# Patient Record
Sex: MALE | Race: WHITE | NOT HISPANIC OR LATINO | Employment: FULL TIME | URBAN - METROPOLITAN AREA
[De-identification: names, ages, dates, MRNs, and addresses within clinical notes are randomized per-mention and may not be internally consistent; named-entity substitution may affect disease eponyms.]

---

## 2017-01-16 ENCOUNTER — OFFICE VISIT (OUTPATIENT)
Dept: INTERNAL MEDICINE | Facility: CLINIC | Age: 42
End: 2017-01-16
Payer: COMMERCIAL

## 2017-01-16 VITALS
TEMPERATURE: 97.6 F | SYSTOLIC BLOOD PRESSURE: 118 MMHG | WEIGHT: 315 LBS | BODY MASS INDEX: 39.17 KG/M2 | HEIGHT: 75 IN | DIASTOLIC BLOOD PRESSURE: 80 MMHG | HEART RATE: 67 BPM | OXYGEN SATURATION: 96 %

## 2017-01-16 DIAGNOSIS — R07.89 CHEST DISCOMFORT: ICD-10-CM

## 2017-01-16 DIAGNOSIS — Z00.01 ENCOUNTER FOR ROUTINE ADULT MEDICAL EXAM WITH ABNORMAL FINDINGS: Primary | ICD-10-CM

## 2017-01-16 PROCEDURE — 93000 ELECTROCARDIOGRAM COMPLETE: CPT | Performed by: INTERNAL MEDICINE

## 2017-01-16 PROCEDURE — 99396 PREV VISIT EST AGE 40-64: CPT | Performed by: INTERNAL MEDICINE

## 2017-01-16 PROCEDURE — 99213 OFFICE O/P EST LOW 20 MIN: CPT | Mod: 25 | Performed by: INTERNAL MEDICINE

## 2017-01-16 NOTE — NURSING NOTE
"Chief Complaint   Patient presents with     Physical     Pt is fasting today.     Chest Pain     x 1 month. Intermittent, around L side chest wall and axilla and sometimes around R chest wall..       Initial /80 mmHg  Pulse 67  Temp(Src) 97.6  F (36.4  C) (Oral)  Ht 6' 2.5\" (1.892 m)  Wt 386 lb 6.4 oz (175.27 kg)  BMI 48.96 kg/m2  SpO2 96% Estimated body mass index is 48.96 kg/(m^2) as calculated from the following:    Height as of this encounter: 6' 2.5\" (1.892 m).    Weight as of this encounter: 386 lb 6.4 oz (175.27 kg).  BP completed using cuff size: large    Kaminibose MA      "

## 2017-01-16 NOTE — MR AVS SNAPSHOT
After Visit Summary   1/16/2017    Ramses Spicer    MRN: [id number]           Patient Information     Date Of Birth          1975        Visit Information        Provider Department      1/16/2017 8:30 AM Rakel Kwan MD [facility]        Today's Diagnoses     Chest discomfort    -  1       Care Instructions    No labs today.    No vaccines today.    EKG today.    If normal, next step is talking to the  re: pectoralis release exercises/manuevers.     If chest symptoms change at all, please let me know.         Follow-ups after your visit        Who to contact     If you have questions or need follow up information about today's clinic visit or your schedule please contact [facility] directly at [phone].  Normal or non-critical lab and imaging results will be communicated to you by MyChart, letter or phone within 4 business days after the clinic has received the results. If you do not hear from us within 7 days, please contact the clinic through [employer] or phone. If you have a critical or abnormal lab result, we will notify you by phone as soon as possible.  Submit refill requests through [employer] or call your pharmacy and they will forward the refill request to us. Please allow 3 business days for your refill to be completed.          Additional Information About Your Visit        MyChart Information     [employer] gives you secure access to your electronic health record. If you see a primary care provider, you can also send messages to your care team and make appointments. If you have questions, please call your primary care clinic.  If you do not have a primary care provider, please call [phone] and they will assist you.        Care EveryWhere ID     This is your Care EveryWhere ID. This could be used by other organizations to access your [facility] medical records  [id number]        Your Vitals Were     Pulse Temperature  "Height BMI (Body Mass Index) Pulse Oximetry       67 97.6  F (36.4  C) (Oral) 6' 2.5\" (1.892 m) 48.96 kg/m2 96%        Blood Pressure from Last 3 Encounters:   01/16/17 118/80   10/26/16 126/84   06/20/16 124/86    Weight from Last 3 Encounters:   01/16/17 386 lb 6.4 oz (175.27 kg)   10/26/16 381 lb 4.8 oz (172.957 kg)   06/20/16 390 lb 6.4 oz (177.084 kg)              We Performed the Following     EKG 12-lead complete w/read - Clinics        Primary Care Provider Office Phone # Fax #    Gael Grant [phone]       [facility] [address] [address]        Thank you!     Thank you for choosing [facility]  for your care. Our goal is always to provide you with excellent care. Hearing back from our patients is one way we can continue to improve our services. Please take a few minutes to complete the written survey that you may receive in the mail after your visit with us. Thank you!             Your Updated Medication List - Protect others around you: Learn how to safely use, store and throw away your medicines at www.disposemymeds.org.          This list is accurate as of: 1/16/17  8:59 AM.  Always use your most recent med list.                   Brand Name Dispense Instructions for use    CLARITIN 10 MG tablet   Generic drug:  loratadine      1 TAB PO QD (Once per day) as needed for ALLERGY SYMPTOMS       PRILOSEC OTC PO      1 tablet daily         "

## 2017-01-16 NOTE — PROGRESS NOTES
"    SUBJECTIVE:                                                      HPI: Ramses Spicer is a pleasant 41 year old male who presents for a physical.    Complains of chest discomfort:  - ongoing for last month  - mostly left side, but a little on right  - pain located in axilla and top of pectoralis muscle  - describes as stiffness - not painful  - usually 2-3/10 in discomfort, occasionally 3-4/10  - no radiation  - non-pleuritic  - occurs at random: unrelated to physical exertion (biking, stairs), lifting (lifts weights regularly), position, or meals  - lasts 20 minutes or so and then goes away on its own - no exacerbating or alleviating factors (no change with rest)    - associated \"warmth\" - ears turn red, but no sweating  - occasional nausea, but no vomiting  - occasional shortness of breath  - occasional light-headedness    - no palpitations    ROS:  Constitutional: denies unintentional weight loss or gain; denies fevers, chills, or sweats     Cardiovascular: see above  Respiratory: see above  Gastrointestinal: denies nausea, vomiting, constipation, diarrhea, or abdominal pain  Genitourinary: denies urinary frequency, urgency, dysuria, or hematuria  Integumentary: denies rash or pruritus  Musculoskeletal: denies back pain, muscle pain, joint pain, or joint swelling  Neurologic: denies focal weakness, numbness, or tingling  Hematologic/Immunologic: denies history of anemia or blood transfusions  Endocrine: denies heat or cold intolerance; denies polyuria, polydipsia  Psychiatric: denies anxiety; see preventative health below    Past Medical History   Diagnosis Date     Acid reflux disease      Morbidly obese (H)      Past Surgical History   Procedure Laterality Date     Gi surgery  infant     ?pylorotomy for pyloric stenosis     Repair tendon achilles Left 3/31/2015     Procedure: REPAIR TENDON ACHILLES;  Surgeon: Camron Schwartz DPM;  Location: [facility]     Excise exostosis foot Left 3/31/2015     Procedure: " "EXCISE EXOSTOSIS FOOT;  Surgeon: Camron Schwartz DPM;  Location: [facility]     Family History   Problem Relation Age of Onset     CEREBROVASCULAR DISEASE Father      early 60s     Pacemaker Father      Myocardial Infarction Paternal Grandmother 97     Myocardial Infarction Maternal Grandfather      in 60s     Type 2 Diabetes Father      resolved with weight loss     Coronary Artery Disease Early Onset No family hx of      Pancreatic Cancer Maternal Grandmother      Colon Cancer No family hx of      Prostate Cancer No family hx of      Social History Main Topics     Smoking status: Never Smoker      Smokeless tobacco: Former User     Quit date: 04/01/2016     Alcohol Use: Yes      Comment: 1 drink/week     Drug Use: No     Sexual Activity:     Partners: Female     Social History Narrative    .    No kids.    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    Stationary bike several times/week; weights     Allergies   Allergen Reactions     Cats      Dogs      Hay Fever & [A.R.M.]      No Known Drug Allergies      Current Outpatient Prescriptions   Medication Sig     PRILOSEC OTC PO 1 tablet daily     CLARITIN 10 MG OR TABS 1 TAB PO QD (Once per day) as needed for ALLERGY SYMPTOMS     Facility-Administered Medications Ordered in Other Visits   Medication     glycopyrrolate (ROBINUL) injection     neostigmine (PROSTIGMINE) injection     Immunization History   Administered Date(s) Administered     Influenza Vaccine IM 3yrs+ 4 Valent IIV4 01/11/2016     Influenza Vaccine, 3 YRS +, IM (QUADRIVALENT W/PRESERVATIVES) 11/25/2014, 10/26/2016     TDAP (ADACEL AGES 11-64) 08/29/2012     OBJECTIVE:                                                      /80 mmHg  Pulse 67  Temp(Src) 97.6  F (36.4  C) (Oral)  Ht 6' 2.5\" (1.892 m)  Wt 386 lb 6.4 oz (175.27 kg)  BMI 48.96 kg/m2  SpO2 96%  Constitutional: well-appearing  Eyes: normal conjunctivae and lids; pupils equal, round, and reactive to light  Ears, " Nose, Mouth, and Throat: normal ears and nose; tympanic membranes visualized and normal; normal lips, teeth, and gums; no oropharyngeal lesions or ulcers  Neck: supple and symmetric; no lymphadenopathy; no thyromegaly or masses  Respiratory: normal respiratory effort; clear to auscultation bilaterally  Cardiovascular: regular rate and rhythm; pedal pulses palpable; no edema  Chest: appears normal - no rash; chest discomfort reproduced with palpation of left pectoralis major lateral attachments (deep left axilla)  Gastrointestinal: soft, non-tender, non-distended, and bowel sounds present; no organomegaly or masses  Musculoskeletal: normal gait and station; no clubbing or cyanosis  Psych: normal judgment and insight; normal mood and affect; recent and remote memory intact; oriented to time, place, and person    PREVENTATIVE HEALTH                                                      Blood pressure: within normal limits   Prostate Cancer Screening: not medically indicated at this time   AAA screening: not medically indicated at this time   Colonoscopy: not medically indicated at this time   Screening HCV: not medically indicated at this time   Screening cholesterol: up to date   Screening diabetes: up to date   STD testing: no risk factors present  Depression screening: PHQ-2 assessment completed and reviewed - no intervention indicated at this time  Alcohol misuse screening: alcohol use reviewed - no intervention indicated at this time  Immunizations: reviewed; up to date     ASSESSMENT/PLAN:                                                       (Z00.01) Encounter for routine adult medical exam with abnormal findings  (primary encounter diagnosis)  Comment: PMH, PSH, FH, SH, medications, allergies, immunizations, and preventative health measures reviewed.   Plan: no preventative health interventions indicated at this time.     (R07.89) Chest discomfort  Comment:    - reassured that pain can be reproduced with  palpation.   - reassured that pain is not associated with exertion and not relieved with rest.   - no active cardiac risk factors.  Plan:    - EKG today.   - if negative, recommend that patient work with his trainers (he's a ) on pectoralis major release exercises.    - patient declines referral to PT.    - if no improvement or if symptoms change or worsen, patient to contact me - low threshold for stress test.     The instructions on the AVS were discussed and explained to the patient. Patient expressed understanding of instructions.    Rakel Kwan MD   [address] [address]  T: [phone], F: [phone]

## 2017-01-16 NOTE — PATIENT INSTRUCTIONS
No labs today.    No vaccines today.    EKG today.    If normal, next step is talking to the  re: pectoralis release exercises/manuevers.     If chest symptoms change at all, please let me know.

## 2017-02-20 ENCOUNTER — OFFICE VISIT (OUTPATIENT)
Dept: PODIATRY | Facility: CLINIC | Age: 42
End: 2017-02-20
Payer: COMMERCIAL

## 2017-02-20 ENCOUNTER — TELEPHONE (OUTPATIENT)
Dept: PODIATRY | Facility: CLINIC | Age: 42
End: 2017-02-20

## 2017-02-20 VITALS
WEIGHT: 315 LBS | SYSTOLIC BLOOD PRESSURE: 128 MMHG | HEIGHT: 75 IN | DIASTOLIC BLOOD PRESSURE: 76 MMHG | BODY MASS INDEX: 39.17 KG/M2

## 2017-02-20 DIAGNOSIS — M21.6X9 PES CAVUS, UNSPECIFIED LATERALITY: ICD-10-CM

## 2017-02-20 DIAGNOSIS — M72.2 PLANTAR FASCIAL FIBROMATOSIS: Primary | ICD-10-CM

## 2017-02-20 PROCEDURE — 99213 OFFICE O/P EST LOW 20 MIN: CPT | Performed by: PODIATRIST

## 2017-02-20 NOTE — MR AVS SNAPSHOT
After Visit Summary   2/20/2017    Ramses Spicer    MRN: [id number]           Patient Information     Date Of Birth          1975        Visit Information        Provider Department      2/20/2017 8:45 AM Camorn Schwartz DPM [facility]        Today's Diagnoses     Plantar fascial fibromatosis    -  1    Pes cavus, unspecified laterality          Care Instructions    Orthotic & Prosthetic Clinic Locations  [facility]  [address]  [place] [address]  Phone: [phone]  Fax: [phone] [facility]  [address]  [place] [address]  Phone: [phone]   Fax: [phone]   [facility]  [address]  [place] [address]  Phone: [phone]  Fax: [phone] [facility]  [address]  [place] [address]  Phone: [phone]   Fax: [phone]   [facility]   [address]  [place] [address] [place] [address]  Phone: [phone]   Fax: [phone] * Please call an location listed to make an appointment for a casting/fitting. Your referral was sent to their central office and they will all have the order on file.     PLANTAR FASCIITIS   What is plantar fasciitis?   Plantar fasciitis is often referred to as heel spurs or heel pain. Plantar fasciitis is a very common problem that affects people of all foot shapes, age, weight and activity level. Pain may be in the arch or on the weight-bearing surface of the heel. The pain maycome on without injury or identifiable cause. Pain is generally present when first getting out of bed in the morning or up from a seated break.   What causes plantar fasciitis?   The plantar fascia is a dense fibrous band of tissue that stretches across the bottom surface of the foot. The fascia helps support the foot muscles  and arch. Plantar fasciitis is thought to be caused by mechanical strain or overload. Frequent walking without shoes or wearing unsupportive shoes is thought to cause structural overload and ultimately inflammation of the plantar fascia. Some people have heel spurs that can be seen on x-ray. The heel spur is actually a minor component of plantar fascitis and is largely ignored.   How long will this last?   Plantar fasciitis can last from one day to a lifetime. Some people get intermittent fascitis that is very short-lived. Others suffer daily for years. Excessive body weight, frequent bare foot walking, long hours on the feet, inadequate shoes, predisposing foot structures and excessive activity such as running are all potential issues that lead to chronic and/or recurring plantar fascitis. Having plantar fasciitis means that you are forever prone to this problem and will require modification of some of the above factors. Most people seek treatment within one to four months. Healing usually requires a similar one to four month time frame. Healing time is relative to the amount of effort spent treating the problem.   What can I do?   The easiest solution is to stop walking around your home without shoes. Plantar fasciitis is largely a shoe problem. Shoes are either not being worn often enough or your current shoes are inadequate for your weight, foot structure or activity level. The majority of shoes on the market today are not sufficient to resist development of plantar fasciitis or to promote healing. Assume that your current shoes are inadequate and will need to be replaced. Even high quality shoes wear out with 6 months to one year of frequent use. Other shoe options can be the ones with springs in the heels. SPIRA spring shoes is one brand type.   Weightloss is another option. Losing ten pounds in the next two months may be enough to resolve the problem. Ice and/or heat applied to the area of pain two to three  times per day for ten minutes each session can be very helpful. This should continue until the problem resolves. Achilles tendon stretching is essential. Stretch multiple times daily to promote healing and to prevent recurrence in the future. Applying bengay or icy/hot to area can also try to calm down pain to the heel area.     What if this does not help?   Medical treatments often include custom arch supports, cortisone injections, physical therapy, splints to be worn in bed, prescription medications and surgery. The home treatments listed above will be necessary regardless of these advanced medical treatments. Surgery is rarely needed but is very helpful in selected cases.     Heel pain in my future?   Plantar fasciitis is highly recurrent. Risk factors often continue, including return to bare foot walking, inadequate shoes, excessive body weight, excessive activities, etc. Your life style and foot structure may predispose you to recurrent plantar fasciitis. A daily prevention regimen can be very helpful. Ongoing use of shoe inserts, careful attention to appropriate shoes, daily Achilles stretching, etc. may prevent recurrence. Prompt attention at the earliest warning signs of heel pain can resolve the problem in as short as a few days.   Below are some exercises for Plantar fasciitis:  Stair exercise  You can step on the stairs with the ball of your foot and hold your position for at least 15 seconds, then slowly step down with the heels of your foot. You can do this daily and as often as you want. Plantar fasciitis exercise equipment and handout materials are useful in relieving pain.  Picking the towel  Plantar fasciitis exercise equipment and handout teach you how to exercise by picking the towel. You can sit comfortably and then pick the towel with your toes. Do this at least 10 to 20 times regularly. You can use any object other than a towel as long as the material can be soft and you can pick it up with  your toes.  Rolling the bottle or ball  You can get a small ball or bottle and then roll it with your foot. Do this daily for at least 15 to 20 times. Plantar fasciitis exercise equipment and handout are very useful in treating the symptoms of the foot condition.  Stretching the calf  You could lie supine, raise one foot, and then point your toes towards the floor. Do this daily for at least 15 to 20 times. The calf is connected to the heel and the balls of the foot, so you should also exercise this also. Plantar fasciitis exercise equipment and handout usually mentions the importance of exercising the calf also.  Flex the toes  Sit comfortably and then flex your toes by pointing it towards the floor or towards your body. This will relax and flex your foot and exercise your plantar fascia, the calf, and the Achilles tendon. The inability of the foot to stretch often causes the bunching up of the plantar fascia area leading to the pain.  Massaging the calf and the plantar fascia also helps a lot in alleviating the pain and preventing its recurrence. If you prefer standard plantar fasciitis exercise equipment and handout, then you can avail of this from legitimate sources. You can use the standard stretching device, the wheel, and the belt. These are all significant devices in treating the pain in the plantar fascia.            Follow-ups after your visit        Who to contact     If you have questions or need follow up information about today's clinic visit or your schedule please contact [facility] directly at [phone].  Normal or non-critical lab and imaging results will be communicated to you by MyChart, letter or phone within 4 business days after the clinic has received the results. If you do not hear from us within 7 days, please contact the clinic through MyChart or phone. If you have a critical or abnormal lab result, we will notify you by phone as soon as possible.  Submit refill  "requests through [employer] or call your pharmacy and they will forward the refill request to us. Please allow 3 business days for your refill to be completed.          Additional Information About Your Visit        TIP [employer] Information     [employer] gives you secure access to your electronic health record. If you see a primary care provider, you can also send messages to your care team and make appointments. If you have questions, please call your primary care clinic.  If you do not have a primary care provider, please call [phone] and they will assist you.        Care EveryWhere ID     This is your Care EveryWhere ID. This could be used by other organizations to access your [facility] medical records  [id number]        Your Vitals Were     Height BMI (Body Mass Index)                6' 2.5\" (1.892 m) 48.9 kg/m2           Blood Pressure from Last 3 Encounters:   02/20/17 128/76   01/16/17 118/80   10/26/16 126/84    Weight from Last 3 Encounters:   02/20/17 (!) 386 lb (175.1 kg)   01/16/17 (!) 386 lb 6.4 oz (175.3 kg)   10/26/16 (!) 381 lb 4.8 oz (173 kg)              Today, you had the following     No orders found for display       Primary Care Provider Office Phone # Fax #    Gael Grant [phone]       [facility] [address] [address]        Thank you!     Thank you for choosing [facility]  for your care. Our goal is always to provide you with excellent care. Hearing back from our patients is one way we can continue to improve our services. Please take a few minutes to complete the written survey that you may receive in the mail after your visit with us. Thank you!             Your Updated Medication List - Protect others around you: Learn how to safely use, store and throw away your medicines at www.disposemymeds.org.          This list is accurate as of: 2/20/17  9:04 AM.  Always use your most recent med list.                   " Brand Name Dispense Instructions for use    CLARITIN 10 MG tablet   Generic drug:  loratadine      1 TAB PO QD (Once per day) as needed for ALLERGY SYMPTOMS       PRILOSEC OTC PO      1 tablet daily

## 2017-02-20 NOTE — PATIENT INSTRUCTIONS
Orthotic & Prosthetic Clinic Locations  [facility]  [address]  [place] [address]  Phone: [phone]  Fax: [phone] [facility]  [address]  [place] [address]  Phone: [phone]   Fax: [phone]   [facility]  [address]  [place] [address]  Phone: [phone]  Fax: [phone] [facility]  [address]  [place] [address]  Phone: [phone]   Fax: [phone]   [facility]   [address]  [place] [address] [place] [address]  Phone: [phone]   Fax: [phone] * Please call an location listed to make an appointment for a casting/fitting. Your referral was sent to their central office and they will all have the order on file.     PLANTAR FASCIITIS   What is plantar fasciitis?   Plantar fasciitis is often referred to as heel spurs or heel pain. Plantar fasciitis is a very common problem that affects people of all foot shapes, age, weight and activity level. Pain may be in the arch or on the weight-bearing surface of the heel. The pain maycome on without injury or identifiable cause. Pain is generally present when first getting out of bed in the morning or up from a seated break.   What causes plantar fasciitis?   The plantar fascia is a dense fibrous band of tissue that stretches across the bottom surface of the foot. The fascia helps support the foot muscles and arch. Plantar fasciitis is thought to be caused by mechanical strain or overload. Frequent walking without shoes or wearing unsupportive shoes is thought to cause structural overload and ultimately inflammation of the plantar fascia. Some people have heel spurs that can be seen on x-ray. The heel spur is actually a minor component of plantar fascitis and is largely ignored.   How long will this last?   Plantar  fasciitis can last from one day to a lifetime. Some people get intermittent fascitis that is very short-lived. Others suffer daily for years. Excessive body weight, frequent bare foot walking, long hours on the feet, inadequate shoes, predisposing foot structures and excessive activity such as running are all potential issues that lead to chronic and/or recurring plantar fascitis. Having plantar fasciitis means that you are forever prone to this problem and will require modification of some of the above factors. Most people seek treatment within one to four months. Healing usually requires a similar one to four month time frame. Healing time is relative to the amount of effort spent treating the problem.   What can I do?   The easiest solution is to stop walking around your home without shoes. Plantar fasciitis is largely a shoe problem. Shoes are either not being worn often enough or your current shoes are inadequate for your weight, foot structure or activity level. The majority of shoes on the market today are not sufficient to resist development of plantar fasciitis or to promote healing. Assume that your current shoes are inadequate and will need to be replaced. Even high quality shoes wear out with 6 months to one year of frequent use. Other shoe options can be the ones with springs in the heels. SPIRA spring shoes is one brand type.   Weightloss is another option. Losing ten pounds in the next two months may be enough to resolve the problem. Ice and/or heat applied to the area of pain two to three times per day for ten minutes each session can be very helpful. This should continue until the problem resolves. Achilles tendon stretching is essential. Stretch multiple times daily to promote healing and to prevent recurrence in the future. Applying bengay or icy/hot to area can also try to calm down pain to the heel area.     What if this does not help?   Medical treatments often include custom arch supports,  cortisone injections, physical therapy, splints to be worn in bed, prescription medications and surgery. The home treatments listed above will be necessary regardless of these advanced medical treatments. Surgery is rarely needed but is very helpful in selected cases.     Heel pain in my future?   Plantar fasciitis is highly recurrent. Risk factors often continue, including return to bare foot walking, inadequate shoes, excessive body weight, excessive activities, etc. Your life style and foot structure may predispose you to recurrent plantar fasciitis. A daily prevention regimen can be very helpful. Ongoing use of shoe inserts, careful attention to appropriate shoes, daily Achilles stretching, etc. may prevent recurrence. Prompt attention at the earliest warning signs of heel pain can resolve the problem in as short as a few days.   Below are some exercises for Plantar fasciitis:  Stair exercise  You can step on the stairs with the ball of your foot and hold your position for at least 15 seconds, then slowly step down with the heels of your foot. You can do this daily and as often as you want. Plantar fasciitis exercise equipment and handout materials are useful in relieving pain.  Picking the towel  Plantar fasciitis exercise equipment and handout teach you how to exercise by picking the towel. You can sit comfortably and then pick the towel with your toes. Do this at least 10 to 20 times regularly. You can use any object other than a towel as long as the material can be soft and you can pick it up with your toes.  Rolling the bottle or ball  You can get a small ball or bottle and then roll it with your foot. Do this daily for at least 15 to 20 times. Plantar fasciitis exercise equipment and handout are very useful in treating the symptoms of the foot condition.  Stretching the calf  You could lie supine, raise one foot, and then point your toes towards the floor. Do this daily for at least 15 to 20 times. The  calf is connected to the heel and the balls of the foot, so you should also exercise this also. Plantar fasciitis exercise equipment and handout usually mentions the importance of exercising the calf also.  Flex the toes  Sit comfortably and then flex your toes by pointing it towards the floor or towards your body. This will relax and flex your foot and exercise your plantar fascia, the calf, and the Achilles tendon. The inability of the foot to stretch often causes the bunching up of the plantar fascia area leading to the pain.  Massaging the calf and the plantar fascia also helps a lot in alleviating the pain and preventing its recurrence. If you prefer standard plantar fasciitis exercise equipment and handout, then you can avail of this from legitimate sources. You can use the standard stretching device, the wheel, and the belt. These are all significant devices in treating the pain in the plantar fascia.

## 2017-02-20 NOTE — PROGRESS NOTES
ASSESSMENT/PLAN:    Encounter Diagnoses   Name Primary?     Plantar fascial fibromatosis Yes     Pes cavus, unspecified laterality      Pt educated about the cause and nature of heel pain.  Treatment plan discussed includes icing, calf and plantar fascial stretching, avoidance of barefoot walking, wearing sturdy, supportive athletic-type shoes, and activity modification.  Educational handouts provided.    Pt is referred to the [facility] for prescription orthoses.      DME order for a Trilok brace to be used with exercise.      Weight management plan: Patient was referred to their PCP to discuss a diet and exercise plan.      Camron Schwartz DPM, FACFAS, MS    [place] & Ankle [facility]      ____________________________________________________________________    HPI:         Chief Complaint: left heel pain  Onset of problem: 1 month  Pain/ discomfort is described as:  shooting  Ratin/10   Frequency:  intermittent    The pain is worse after exercise  Previous treatment: ice, rest    Secondary concerns:  He also is requesting new orthoses.   History of left Achilles surgery, 3/31/15.     *  Past Medical History   Diagnosis Date     Acid reflux disease      Morbidly obese (H)    *  *  Past Surgical History   Procedure Laterality Date     Gi surgery  infant     ?pylorotomy for pyloric stenosis     Repair tendon achilles Left 3/31/2015     Procedure: REPAIR TENDON ACHILLES;  Surgeon: Camron Schwartz DPM;  Location: [facility]     Excise exostosis foot Left 3/31/2015     Procedure: EXCISE EXOSTOSIS FOOT;  Surgeon: Camron Schwartz DPM;  Location: [facility]   *  *  Current Outpatient Prescriptions   Medication Sig Dispense Refill     order for DME Equipment being ordered: Trilok ankle brace 1 Device 1     PRILOSEC OTC PO 1 tablet daily       CLARITIN 10 MG OR TABS 1 TAB PO QD (Once per day) as needed for ALLERGY SYMPTOMS         ROS:    A 10-point review of systems was  "performed.  It is positive for that noted in the HPI and as seen below.  All other systems found to be negative.     Numbness in feet?  no   Calf pain with walking? yes  Recent foot/ankle injury? no  Weight change  over past 12 months? 10# loss  Self perception as overweight? yes  Recent flu-like symptoms? no  Joint pain other than feet ? no    EXAM:    Vitals: /76  Ht 6' 2.5\" (1.892 m)  Wt (!) 386 lb (175.1 kg)  BMI 48.9 kg/m2  BMI: Body mass index is 48.9 kg/(m^2).  Height: 6' 2.5\"    Constitutional/ general:  Pt is in no apparent distress, appears well-nourished.  Cooperative with history and physical exam.     Vascular:  Pedal pulses are palpable bilaterally for both the DP and PT arteries.  CFT < 3 sec.  No edema.  Pedal hair growth noted.     Neuro:  Alert and oriented x 3. Coordinated gait.  Light touch sensation is intact to the L4, L5, S1 distributions. No obvious deficits.  No evidence of neurological-based weakness, spasticity, or contracture in the lower extremities.     Derm: Normal texture and turgor.  No erythema, ecchymosis, or cyanosis.  No open lesions.     Musculoskeletal:    Lower extremity muscle strength is normal.  Patient is ambulatory without an assistive device or brace .  No gross deformities.  Pes cavus.  Pain on palpation to the plantar medial aspect of the left heel.  No pain with   side-to-side compression of the heel.  There is b/l limited ankle dorsiflexion with knee extended.   ROM is increased with flexion of the knee b/l.        Camron Schwartz DPM, FACFAS, MS    [place] [facility] & Ankle Surgery            "

## 2017-02-20 NOTE — TELEPHONE ENCOUNTER
Reason for Call: Request for an order or referral:    Order or referral being requested: orthotics    Date needed: as soon as possible    Has the patient been seen by the PCP for this problem? YES    Additional comments: [facility]---[phone]  Pt saw Dr Schwartz today 2/20/17.  The order for the orthotics hadn't been placed yet when the pt called to make an appt for the orthotics.    Phone number Patient can be reached at:  Home number on file [phone] (home)    Best Time:  anytime    Can we leave a detailed message on this number?  YES    Call taken on 2/20/2017 at 10:29 AM by PK GARCIA

## 2017-02-20 NOTE — NURSING NOTE
"Chief Complaint   Patient presents with     Foot Problems     heel pain on left foot x1 month, would also like new orthtics      Consult     on calluses of both feet        Initial /76  Ht 6' 2.5\" (1.892 m)  Wt (!) 386 lb (175.1 kg)  BMI 48.9 kg/m2 Estimated body mass index is 48.9 kg/(m^2) as calculated from the following:    Height as of this encounter: 6' 2.5\" (1.892 m).    Weight as of this encounter: 386 lb (175.1 kg).  Medication Reconciliation: complete   Bhargavi Brice MA      "

## 2017-07-11 ENCOUNTER — OFFICE VISIT (OUTPATIENT)
Dept: INTERNAL MEDICINE | Facility: CLINIC | Age: 42
End: 2017-07-11
Payer: COMMERCIAL

## 2017-07-11 VITALS
HEART RATE: 69 BPM | DIASTOLIC BLOOD PRESSURE: 68 MMHG | BODY MASS INDEX: 48.01 KG/M2 | SYSTOLIC BLOOD PRESSURE: 128 MMHG | OXYGEN SATURATION: 96 % | WEIGHT: 315 LBS | TEMPERATURE: 98.4 F

## 2017-07-11 DIAGNOSIS — A08.4 VIRAL GASTROENTERITIS: Primary | ICD-10-CM

## 2017-07-11 PROCEDURE — 99213 OFFICE O/P EST LOW 20 MIN: CPT | Performed by: INTERNAL MEDICINE

## 2017-07-11 NOTE — PROGRESS NOTES
SUBJECTIVE:                                                    Ramses Spicer is a 41 year old male who presents to clinic today for the following health issues:      Diarrhea  Onset: 5 days ago    Description:   Consistency of stool: watery  Blood in stool: no   Number of loose stools in past 24 hours: 3    Progression of Symptoms:  same    Accompanying Signs & Symptoms:  Fever: no   Nausea or vomiting; no   Abdominal pain: YES cramping right before the loose stool  Episodes of constipation: no   Weight loss: YES    History:   Ill contacts: no   Recent use of antibiotics: no    Recent travels: no          Recent medication-new or changes(Rx or OTC): no     Precipitating factors:   none    Alleviating factors:   Imodium     Therapies Tried and outcome:  Imodium AD; Outcome: was helpful for about 24 hours     Problem list and histories reviewed & adjusted, as indicated.  Additional history: as documented      Reviewed and updated as needed this visit by clinical staff  Allergies       Reviewed and updated as needed this visit by Provider         ROS:  Constitutional, HEENT, cardiovascular, pulmonary, gi and gu systems are negative, except as otherwise noted.    OBJECTIVE:                                                    /68  Pulse 69  Temp 98.4  F (36.9  C) (Oral)  Wt (!) 379 lb (171.9 kg)  SpO2 96%  BMI 48.01 kg/m2  Body mass index is 48.01 kg/(m^2).  GENERAL APPEARANCE: alert, no distress and over weight  HENT: nose and mouth without ulcers or lesions  NECK: no adenopathy, no asymmetry, masses, or scars and thyroid normal to palpation  RESP: lungs clear to auscultation - no rales, rhonchi or wheezes  CV: regular rates and rhythm, normal S1 S2, no S3 or S4 and no murmur, click or rub  ABDOMEN: soft, nontender, without hepatosplenomegaly or masses and bowel sounds normal    Diagnostic test results:  none      ASSESSMENT/PLAN:                                                    1. Viral  gastroenteritis  Nothing suggestive of anything significant- typical viral gastroenteritis/diarrhea  - see pt info  - prn imodium use  - push fluids    Shamir Yanez MD  [facility]

## 2017-07-11 NOTE — PATIENT INSTRUCTIONS
Diet for Vomiting or Diarrhea (Adult)    Your symptoms may return or get worse after eating certain foods listed below. If this happens, stop eating these foods until your symptoms ease and you feel better.  Once the vomiting stops, follow the steps below.   During the first 12 to 24 hours  During the first 12 to 24 hours, follow this diet:    Drinks. Plain water, sport drinks like electrolyte solutions, soft drinks without caffeine, mineral water (plain or flavored), clear fruit juices, and decaffeinated tea and coffee.    Soups. Clear broth.    Desserts. Plain gelatin, popsicles, and fruit juice bars. As you feel better, you may add 6 to 8 ounces of yogurt per day. If you have diarrhea, don't have foods or drinks that contain sugar, high-fructose corn syrup, or sugar alcohols.  During the next 24 hours  During the next 24 hours you may add the following to the above:    Hot cereal, plain toast, bread, rolls, and crackers    Plain noodles, rice, mashed potatoes, and chicken noodle or rice soup    Unsweetened canned fruit (but not pineapple) and bananas  Don't eat more than 15 grams of fat a day. Do this by staying away from margarine, butter, oils, mayonnaise, sauces, gravies, fried foods, peanut butter, meat, poultry, and fish.  Don't eat much fiber. Stay away from raw or cooked vegetables, fresh fruits (except bananas), and bran cereals.  Limit how much caffeine and chocolate you have. Do not use any spices or seasonings except salt.  During the next 24 hours  Slowly go back to your normal diet, as you feel better and your symptoms ease.  Date Last Reviewed: 8/1/2016 2000-2017 [employer]. [address] [place] [address], [place] [address]. All rights reserved. This information is not intended as a substitute for professional medical care. Always follow your healthcare professional's instructions.

## 2017-07-11 NOTE — MR AVS SNAPSHOT
After Visit Summary   7/11/2017    Ramses Spicer    MRN: [id number]           Patient Information     Date Of Birth          1975        Visit Information        Provider Department      7/11/2017 7:00 AM Shamir Yanez MD [facility]        Today's Diagnoses     Viral gastroenteritis    -  1      Care Instructions      Diet for Vomiting or Diarrhea (Adult)    Your symptoms may return or get worse after eating certain foods listed below. If this happens, stop eating these foods until your symptoms ease and you feel better.  Once the vomiting stops, follow the steps below.   During the first 12 to 24 hours  During the first 12 to 24 hours, follow this diet:    Drinks. Plain water, sport drinks like electrolyte solutions, soft drinks without caffeine, mineral water (plain or flavored), clear fruit juices, and decaffeinated tea and coffee.    Soups. Clear broth.    Desserts. Plain gelatin, popsicles, and fruit juice bars. As you feel better, you may add 6 to 8 ounces of yogurt per day. If you have diarrhea, don't have foods or drinks that contain sugar, high-fructose corn syrup, or sugar alcohols.  During the next 24 hours  During the next 24 hours you may add the following to the above:    Hot cereal, plain toast, bread, rolls, and crackers    Plain noodles, rice, mashed potatoes, and chicken noodle or rice soup    Unsweetened canned fruit (but not pineapple) and bananas  Don't eat more than 15 grams of fat a day. Do this by staying away from margarine, butter, oils, mayonnaise, sauces, gravies, fried foods, peanut butter, meat, poultry, and fish.  Don't eat much fiber. Stay away from raw or cooked vegetables, fresh fruits (except bananas), and bran cereals.  Limit how much caffeine and chocolate you have. Do not use any spices or seasonings except salt.  During the next 24 hours  Slowly go back to your normal diet, as you feel better and your symptoms ease.  Date Last  Reviewed: 8/1/2016 2000-2017 [employer]. [address] [place] [address], [place] [address]. All rights reserved. This information is not intended as a substitute for professional medical care. Always follow your healthcare professional's instructions.                Follow-ups after your visit        Who to contact     If you have questions or need follow up information about today's clinic visit or your schedule please contact [facility] directly at [phone].  Normal or non-critical lab and imaging results will be communicated to you by [employer], letter or phone within 4 business days after the clinic has received the results. If you do not hear from us within 7 days, please contact the clinic through [employer] or phone. If you have a critical or abnormal lab result, we will notify you by phone as soon as possible.  Submit refill requests through [employer] or call your pharmacy and they will forward the refill request to us. Please allow 3 business days for your refill to be completed.          Additional Information About Your Visit        [employer] Information     [employer] gives you secure access to your electronic health record. If you see a primary care provider, you can also send messages to your care team and make appointments. If you have questions, please call your primary care clinic.  If you do not have a primary care provider, please call [phone] and they will assist you.        Care EveryWhere ID     This is your Care EveryWhere ID. This could be used by other organizations to access your [facility] medical records  [id number]        Your Vitals Were     Pulse Temperature Pulse Oximetry BMI (Body Mass Index)          69 98.4  F (36.9  C) (Oral) 96% 48.01 kg/m2         Blood Pressure from Last 3 Encounters:   07/11/17 128/68   02/20/17 128/76   01/16/17 118/80    Weight from Last 3 Encounters:   07/11/17 (!) 379 lb (171.9 kg)   02/20/17 (!) 386 lb (175.1 kg)   01/16/17  (!) 386 lb 6.4 oz (175.3 kg)              Today, you had the following     No orders found for display       Primary Care Provider Office Phone # Fax #    Gael Grant [phone]       [facility] [address] [place] [address]        Equal Access to Services     GLADYS EDOUARD : Hadii aad ku hadasho Soomaali, waaxda luqadaha, qaybta kaalmada adeegyada, waxay idiin hayaan adeeg kharash la'aan ah. So [facility] [phone].    ATENCIÓN: Si habla español, tiene a byers disposición servicios gratuitos de asistencia lingüística. Llame al [phone].    We comply with applicable federal civil rights laws and [place] laws. We do not discriminate on the basis of race, color, national origin, age, disability sex, sexual orientation or gender identity.            Thank you!     Thank you for choosing [facility]  for your care. Our goal is always to provide you with excellent care. Hearing back from our patients is one way we can continue to improve our services. Please take a few minutes to complete the written survey that you may receive in the mail after your visit with us. Thank you!             Your Updated Medication List - Protect others around you: Learn how to safely use, store and throw away your medicines at www.disposemymeds.org.          This list is accurate as of: 7/11/17  7:28 AM.  Always use your most recent med list.                   Brand Name Dispense Instructions for use Diagnosis    CLARITIN 10 MG tablet   Generic drug:  loratadine      1 TAB PO QD (Once per day) as needed for ALLERGY SYMPTOMS        PRILOSEC OTC PO      1 tablet daily

## 2017-10-04 ENCOUNTER — NURSE TRIAGE (OUTPATIENT)
Dept: NURSING | Facility: CLINIC | Age: 42
End: 2017-10-04

## 2017-10-04 ENCOUNTER — HOSPITAL ENCOUNTER (EMERGENCY)
Facility: CLINIC | Age: 42
Discharge: HOME | End: 2017-10-04
Attending: EMERGENCY MEDICINE | Admitting: EMERGENCY MEDICINE
Payer: COMMERCIAL

## 2017-10-04 VITALS
WEIGHT: 315 LBS | BODY MASS INDEX: 39.17 KG/M2 | DIASTOLIC BLOOD PRESSURE: 73 MMHG | SYSTOLIC BLOOD PRESSURE: 150 MMHG | TEMPERATURE: 97.7 F | HEIGHT: 75 IN | OXYGEN SATURATION: 97 %

## 2017-10-04 DIAGNOSIS — T18.9XXA SWALLOWED FOREIGN BODY, INITIAL ENCOUNTER: ICD-10-CM

## 2017-10-04 PROCEDURE — 25000125 ZZHC RX 250

## 2017-10-04 PROCEDURE — 99283 EMERGENCY DEPT VISIT LOW MDM: CPT | Mod: 25

## 2017-10-04 PROCEDURE — 92511 NASOPHARYNGOSCOPY: CPT

## 2017-10-04 RX ORDER — LIDOCAINE HYDROCHLORIDE 40 MG/ML
SOLUTION TOPICAL
Status: COMPLETED
Start: 2017-10-04 | End: 2017-10-04

## 2017-10-04 RX ADMIN — LIDOCAINE HYDROCHLORIDE 2 ML: 40 SOLUTION TOPICAL at 05:20

## 2017-10-04 ASSESSMENT — ENCOUNTER SYMPTOMS
CHOKING: 0
TROUBLE SWALLOWING: 0
COUGH: 0

## 2017-10-04 NOTE — DISCHARGE INSTRUCTIONS
Swallowed Foreign Body (Adult)  Indigestible objects (foreign bodies) are sometimes swallowed by adults. Whether or not the object moves all the way through the digestive tract depends on many factors. This includes the size and shape of the object, whether the object is sharp and pointy, and what the object is made of.  Based on your evaluation, no treatment is needed at this time. The swallowed object is expected to move through your digestive tract and pass out of the body in the stool with no problems. This may take about 24 to 48 hours. If imaging tests were done, you will be told when the results are ready and if they affect your treatment.  Home care    Follow any instructions you receive from your provider about eating and drinking. In certain cases, you may be told to only eat soft foods and drink liquids for the first 24 to 48 hours.    You will need to check your stool each time you have a bowel movement in the next 24 to 48 hours. This is so you can confirm that the object has passed. If the object does not pass during this time, it may mean that the object is lodged (stuck) somewhere along the digestive tract. In such cases, the object may need to be removed with a procedure.  Follow-up care  Follow up with your healthcare provider as advised. You will be told if further treatment is needed. In certain cases, you may need to return to have imaging tests done.  When to seek medical advice  Call your healthcare provider right away if any of these occur:    Belly pain, cramps, or swelling    Shortness of breath or coughing that won t stop    Trouble swallowing or pain with swallowing    Vomiting that won t stop    Blood in the stool (dark red or black color)    Fever of 100.4 F (38 C) or higher, or as directed by your provider  Call 911  Call 911 or return to the emergency department right away if any of these occur:    Trouble breathing, wheezing    Trouble speaking    Unusually fast heart rate    New  or worsening chest pain    Vomiting blood (red or black)    Swelling of the neck    Inability to pass stool  Date Last Reviewed: 6/22/2015 2000-2017 [employer]. [address] [place] [address], [place] [address]. All rights reserved. This information is not intended as a substitute for professional medical care. Always follow your healthcare professional's instructions.

## 2017-10-04 NOTE — ED AVS SNAPSHOT
Emergency Department    [address] [place] [address]    Phone:  [phone]    Fax:  [phone]                                       Ramses Spicer   MRN: [id number]    Department:   Emergency Department   Date of Visit:  10/4/2017           After Visit Summary Signature Page     I have received my discharge instructions, and my questions have been answered. I have discussed any challenges I see with this plan with the nurse or doctor.    ..........................................................................................................................................  Patient/Patient Representative Signature      ..........................................................................................................................................  Patient Representative Print Name and Relationship to Patient    ..................................................               ................................................  Date                                            Time    ..........................................................................................................................................  Reviewed by Signature/Title    ...................................................              ..............................................  Date                                                            Time

## 2017-10-04 NOTE — TELEPHONE ENCOUNTER
Reason for Disposition    Patient sounds very sick or weak to the triager    Additional Information    Negative: Severe difficulty breathing (e.g., struggling for each breath, speaks in single words, stridor)    Negative: Sounds like a life-threatening emergency to the triager    Negative: Injury to tooth or teeth    Negative: Injury to mouth    Negative: Canker sore suspected (i.e., aphthous ulcer) in the mouth    Negative: Cold sore suspected (i.e., fever blister sore) on the outer lip    Negative: Tooth is painful or swelling around a tooth    Negative: Mouth is painful    Negative: Throat is painful    Negative: Tongue swelling    Negative: Lip swelling    Negative: [1] Drooling or spitting out saliva (because can't swallow) AND [2] new onset    Negative: [1] Bleeding from mouth AND [2] won't stop after 10 minutes of direct pressure    Negative: Chemical burn of mouth    Negative: Electrical burn of mouth    Negative: [1] Difficulty breathing AND [2] not severe    Protocols used: MOUTH SYMPTOMS-ADULT-

## 2017-10-04 NOTE — ED AVS SNAPSHOT
Emergency Department    [address] [facility] [address]    Phone:  [phone]    Fax:  [phone]                                       Ramses Spicer   MRN: [id number]    Department:   Emergency Department   Date of Visit:  10/4/2017           Patient Information     Date Of Birth          1975        Your diagnoses for this visit were:     Swallowed foreign body, initial encounter        You were seen by Ant Gallego MD.      Follow-up Information     Follow up with Gael Grant MD. Schedule an appointment as soon as possible for a visit in 2 days.    Specialty:  Internal Medicine    Why:  As needed    Contact information:    [address] [facility] [address]  [phone]          Follow up with  Emergency Department.    Specialty:  EMERGENCY MEDICINE    Why:  If symptoms worsen    Contact information:    [address] [facility] [id number] [phone]        Discharge Instructions         Swallowed Foreign Body (Adult)  Indigestible objects (foreign bodies) are sometimes swallowed by adults. Whether or not the object moves all the way through the digestive tract depends on many factors. This includes the size and shape of the object, whether the object is sharp and pointy, and what the object is made of.  Based on your evaluation, no treatment is needed at this time. The swallowed object is expected to move through your digestive tract and pass out of the body in the stool with no problems. This may take about 24 to 48 hours. If imaging tests were done, you will be told when the results are ready and if they affect your treatment.  Home care    Follow any instructions you receive from your provider about eating and drinking. In certain cases, you may be told to only eat soft foods and drink liquids for the first 24 to 48 hours.    You will need to check your stool each time you have a bowel movement in the next 24 to 48 hours. This is so  you can confirm that the object has passed. If the object does not pass during this time, it may mean that the object is lodged (stuck) somewhere along the digestive tract. In such cases, the object may need to be removed with a procedure.  Follow-up care  Follow up with your healthcare provider as advised. You will be told if further treatment is needed. In certain cases, you may need to return to have imaging tests done.  When to seek medical advice  Call your healthcare provider right away if any of these occur:    Belly pain, cramps, or swelling    Shortness of breath or coughing that won t stop    Trouble swallowing or pain with swallowing    Vomiting that won t stop    Blood in the stool (dark red or black color)    Fever of 100.4 F (38 C) or higher, or as directed by your provider  Call 911  Call 911 or return to the emergency department right away if any of these occur:    Trouble breathing, wheezing    Trouble speaking    Unusually fast heart rate    New or worsening chest pain    Vomiting blood (red or black)    Swelling of the neck    Inability to pass stool  Date Last Reviewed: 6/22/2015 2000-2017 [employer]. [address] [place] [address]. All rights reserved. This information is not intended as a substitute for professional medical care. Always follow your healthcare professional's instructions.          24 Hour Appointment Hotline       To make an appointment at any [facility], call [phone] ([phone]). If you don't have a family doctor or clinic, we will help you find one. [facility] clinics are conveniently located to serve the needs of you and your family.             Review of your medicines      Our records show that you are taking the medicines listed below. If these are incorrect, please call your family doctor or clinic.        Dose / Directions Last dose taken    CLARITIN 10 MG tablet   Generic drug:  loratadine        1 TAB PO QD (Once per day) as  needed for ALLERGY SYMPTOMS   Refills:  0        PRILOSEC OTC PO        1 tablet daily   Refills:  0                Orders Needing Specimen Collection     None      Pending Results     No orders found from 10/2/2017 to 10/5/2017.            Pending Culture Results     No orders found from 10/2/2017 to 10/5/2017.            Pending Results Instructions     If you had any lab results that were not finalized at the time of your Discharge, you can call the ED Lab Result RN at [phone]. You will be contacted by this team for any positive Lab results or changes in treatment. The nurses are available 7 days a week from 10A to 6:30P.  You can leave a message 24 hours per day and they will return your call.        Test Results From Your Hospital Stay               Clinical Quality Measure: Blood Pressure Screening     Your blood pressure was checked while you were in the emergency department today. The last reading we obtained was  BP: 150/73 . Please read the guidelines below about what these numbers mean and what you should do about them.  If your systolic blood pressure (the top number) is less than 120 and your diastolic blood pressure (the bottom number) is less than 80, then your blood pressure is normal. There is nothing more that you need to do about it.  If your systolic blood pressure (the top number) is 120-139 or your diastolic blood pressure (the bottom number) is 80-89, your blood pressure may be higher than it should be. You should have your blood pressure rechecked within a year by a primary care provider.  If your systolic blood pressure (the top number) is 140 or greater or your diastolic blood pressure (the bottom number) is 90 or greater, you may have high blood pressure. High blood pressure is treatable, but if left untreated over time it can put you at risk for heart attack, stroke, or kidney failure. You should have your blood pressure rechecked by a primary care provider within the next 4  "weeks.  If your provider in the emergency department today gave you specific instructions to follow-up with your doctor or provider even sooner than that, you should follow that instruction and not wait for up to 4 weeks for your follow-up visit.        Thank you for choosing [facility]       Thank you for choosing [facility] for your care. Our goal is always to provide you with excellent care. Hearing back from our patients is one way we can continue to improve our services. Please take a few minutes to complete the written survey that you may receive in the mail after you visit with us. Thank you!        [employer] Information     [employer] lets you send messages to your doctor, view your test results, renew your prescriptions, schedule appointments and more. To sign up, go to [email or web address] . Click on \"Log in\" on the left side of the screen, which will take you to the Welcome page. Then click on \"Sign up Now\" on the right side of the page.     You will be asked to enter the access code listed below, as well as some personal information. Please follow the directions to create your username and password.     Your access code is: [email or web address]  Expires: 2018  5:35 AM     Your access code will  in 90 days. If you need help or a new code, please call your [facility] clinic or [phone].        Care EveryWhere ID     This is your Care EveryWhere ID. This could be used by other organizations to access your [facility] medical records  [id number]        Equal Access to Services     GLADYS EDOUARD : Hadii aj Kessler, waaxmilton lugo, qaybta kaalmamilton dunlap, govind dumont. So [facility] [phone].    ATENCIÓN: Si habla español, tiene a byers disposición servicios gratuitos de asistencia lingüística. Llame al [phone].    We comply with applicable federal civil rights laws and [place] laws. We do not discriminate on the basis of race, color, national origin, age, " disability, sex, sexual orientation, or gender identity.            After Visit Summary       This is your record. Keep this with you and show to your community pharmacist(s) and doctor(s) at your next visit.

## 2017-12-19 DIAGNOSIS — Z23 NEED FOR TDAP VACCINATION: Primary | ICD-10-CM

## 2017-12-19 PROCEDURE — 90471 IMMUNIZATION ADMIN: CPT

## 2017-12-19 PROCEDURE — 90715 TDAP VACCINE 7 YRS/> IM: CPT

## 2018-01-09 ENCOUNTER — OFFICE VISIT (OUTPATIENT)
Dept: INTERNAL MEDICINE | Facility: CLINIC | Age: 43
End: 2018-01-09
Payer: COMMERCIAL

## 2018-01-09 VITALS
DIASTOLIC BLOOD PRESSURE: 72 MMHG | HEART RATE: 73 BPM | OXYGEN SATURATION: 99 % | BODY MASS INDEX: 39.17 KG/M2 | HEIGHT: 75 IN | TEMPERATURE: 98 F | WEIGHT: 315 LBS | SYSTOLIC BLOOD PRESSURE: 114 MMHG

## 2018-01-09 DIAGNOSIS — R10.31 BILATERAL GROIN PAIN: Primary | ICD-10-CM

## 2018-01-09 DIAGNOSIS — R10.32 BILATERAL GROIN PAIN: Primary | ICD-10-CM

## 2018-01-09 PROCEDURE — 99213 OFFICE O/P EST LOW 20 MIN: CPT | Performed by: PHYSICIAN ASSISTANT

## 2018-01-09 NOTE — PROGRESS NOTES
"  SUBJECTIVE:   Ramses Spicer is a 42 year old male who presents to clinic today for the following health issues:      Concern - Groin/Above Penis Pain    Onset: x1 wk     Description:   Pt states he has noticed a pain right above and along side of his penis that radiates down to the scrotum, this pain comes and goes x1 wk   Bilateral pain above the penis and testes and radiation below to under the scrotum  No change in pain with BM   No dysuria.   No penial discharge.   No fever chills, or swelling of the scrotum     Intensity: moderate    Progression of Symptoms:  same    Accompanying Signs & Symptoms:  Na     Previous history of similar problem:   No     Precipitating factors:   Worsened by: nothing     Alleviating factors:  Improved by: nothing     Therapies Tried and outcome: aleve-helped for the time being   Pain much better today       -------------------------------------    Problem list and histories reviewed & adjusted, as indicated.  Additional history: as documented    Labs reviewed in EPIC    Reviewed and updated as needed this visit by clinical staffTobacco  Allergies       Reviewed and updated as needed this visit by Provider         ROS:  Constitutional, HEENT, cardiovascular, pulmonary, gi and gu systems are negative, except as otherwise noted.      OBJECTIVE:   /72 (BP Location: Left arm, Patient Position: Chair, Cuff Size: Adult Large)  Pulse 73  Temp 98  F (36.7  C) (Oral)  Ht 6' 2.5\" (1.892 m)  Wt (!) 378 lb 3.2 oz (171.6 kg)  SpO2 99%  BMI 47.91 kg/m2  Body mass index is 47.91 kg/(m^2).  GENERAL: healthy, alert and no distress  RESP: lungs clear to auscultation - no rales, rhonchi or wheezes  CV: regular rate and rhythm, normal S1 S2, no S3 or S4, no murmur, click or rub, no peripheral edema and peripheral pulses strong  ABDOMEN: soft, nontender, without hepatosplenomegaly or masses, bowel sounds normal and obese   (male): testicles normal without atrophy or masses, penis " normal without urethral discharge and tenderness to palpation above the base of the penis, mild- in the , midline. No mass, but patient obese with large fat pad noted.   No inguinal hernia or mass in the scrotum   MS: no gross musculoskeletal defects noted, no edema    Diagnostic Test Results:  none     ASSESSMENT/PLAN:             1. Bilateral groin pain  Pain improving,   Continue to monitor, if this does not resolve see gen surg for consult   - GENERAL SURG ADULT REFERRAL        Nathaly Pitt PA-C  [facility]

## 2018-01-09 NOTE — MR AVS SNAPSHOT
After Visit Summary   1/9/2018    Ramses Spicer    MRN: [id number]           Patient Information     Date Of Birth          1975        Visit Information        Provider Department      1/9/2018 9:00 AM Nathaly Pitt PA-C [facility]        Today's Diagnoses     Bilateral groin pain    -  1      Care Instructions    Monitor,  If not continuing to improve then see gen surg for eval          Follow-ups after your visit        Additional Services     GENERAL SURG ADULT REFERRAL       Your provider has referred you to: [place]: [facility] Ramandeep Peter [phone]   [email or web address]    Please be aware that coverage of these services is subject to the terms and limitations of your health insurance plan.  Call member services at your health plan with any benefit or coverage questions.      Please bring the following with you to your appointment:    (1) Any X-Rays, CTs or MRIs which have been performed.  Contact the facility where they were done to arrange for  prior to your scheduled appointment.   (2) List of current medications   (3) This referral request   (4) Any documents/labs given to you for this referral                  Who to contact     If you have questions or need follow up information about today's clinic visit or your schedule please contact [facility] directly at [phone].  Normal or non-critical lab and imaging results will be communicated to you by MyChart, letter or phone within 4 business days after the clinic has received the results. If you do not hear from us within 7 days, please contact the clinic through MyChart or phone. If you have a critical or abnormal lab result, we will notify you by phone as soon as possible.  Submit refill requests through [employer] or call your pharmacy and they will forward the refill request to us. Please allow 3 business days for your  "refill to be completed.          Additional Information About Your Visit        [employer] Information     [employer] gives you secure access to your electronic health record. If you see a primary care provider, you can also send messages to your care team and make appointments. If you have questions, please call your primary care clinic.  If you do not have a primary care provider, please call [phone] and they will assist you.        Care EveryWhere ID     This is your Care EveryWhere ID. This could be used by other organizations to access your [facility] medical records  [id number]        Your Vitals Were     Pulse Temperature Height Pulse Oximetry BMI (Body Mass Index)       73 98  F (36.7  C) (Oral) 6' 2.5\" (1.892 m) 99% 47.91 kg/m2        Blood Pressure from Last 3 Encounters:   01/09/18 114/72   10/04/17 150/73   07/11/17 128/68    Weight from Last 3 Encounters:   01/09/18 (!) 378 lb 3.2 oz (171.6 kg)   10/04/17 (!) 368 lb 2.7 oz (167 kg)   07/11/17 (!) 379 lb (171.9 kg)              We Performed the Following     GENERAL SURG ADULT REFERRAL        Primary Care Provider Office Phone # Fax #    Gael Grant [phone]       [address] [place], [place] [id number]        Equal Access to Services     GLADYS EDOUARD AH: Hadii aad ku hadasho Soomaali, waaxda luqadaha, qaybta kaalmada aderavenda, govind dumont. So [facility] [phone].    ATENCIÓN: Si habla español, tiene a byers disposición servicios gratuitos de asistencia lingüística. Llame al [phone].    We comply with applicable federal civil rights laws and [place] laws. We do not discriminate on the basis of race, color, national origin, age, disability, sex, sexual orientation, or gender identity.            Thank you!     Thank you for choosing [facility]  for your care. Our goal is always to provide you with excellent care. Hearing back from our patients is one way we can continue to " improve our services. Please take a few minutes to complete the written survey that you may receive in the mail after your visit with us. Thank you!             Your Updated Medication List - Protect others around you: Learn how to safely use, store and throw away your medicines at www.disposemymeds.org.          This list is accurate as of: 1/9/18  9:16 AM.  Always use your most recent med list.                   Brand Name Dispense Instructions for use Diagnosis    CLARITIN 10 MG tablet   Generic drug:  loratadine      1 TAB PO QD (Once per day) as needed for ALLERGY SYMPTOMS        PRILOSEC OTC PO      1 tablet daily

## 2018-03-23 ENCOUNTER — OFFICE VISIT (OUTPATIENT)
Dept: INTERNAL MEDICINE | Facility: CLINIC | Age: 43
End: 2018-03-23
Payer: COMMERCIAL

## 2018-03-23 VITALS
WEIGHT: 315 LBS | OXYGEN SATURATION: 96 % | RESPIRATION RATE: 20 BRPM | SYSTOLIC BLOOD PRESSURE: 124 MMHG | HEART RATE: 74 BPM | DIASTOLIC BLOOD PRESSURE: 62 MMHG | TEMPERATURE: 97.9 F | BODY MASS INDEX: 40.43 KG/M2 | HEIGHT: 74 IN

## 2018-03-23 DIAGNOSIS — K14.6 TONGUE PAIN: Primary | ICD-10-CM

## 2018-03-23 PROCEDURE — 99212 OFFICE O/P EST SF 10 MIN: CPT | Performed by: PHYSICIAN ASSISTANT

## 2018-03-23 ASSESSMENT — ANXIETY QUESTIONNAIRES
2. NOT BEING ABLE TO STOP OR CONTROL WORRYING: NOT AT ALL
IF YOU CHECKED OFF ANY PROBLEMS ON THIS QUESTIONNAIRE, HOW DIFFICULT HAVE THESE PROBLEMS MADE IT FOR YOU TO DO YOUR WORK, TAKE CARE OF THINGS AT HOME, OR GET ALONG WITH OTHER PEOPLE: NOT DIFFICULT AT ALL
GAD7 TOTAL SCORE: 4
1. FEELING NERVOUS, ANXIOUS, OR ON EDGE: NOT AT ALL
3. WORRYING TOO MUCH ABOUT DIFFERENT THINGS: SEVERAL DAYS
5. BEING SO RESTLESS THAT IT IS HARD TO SIT STILL: SEVERAL DAYS
7. FEELING AFRAID AS IF SOMETHING AWFUL MIGHT HAPPEN: SEVERAL DAYS
6. BECOMING EASILY ANNOYED OR IRRITABLE: NOT AT ALL

## 2018-03-23 ASSESSMENT — PATIENT HEALTH QUESTIONNAIRE - PHQ9: 5. POOR APPETITE OR OVEREATING: SEVERAL DAYS

## 2018-03-23 ASSESSMENT — PAIN SCALES - GENERAL: PAINLEVEL: MILD PAIN (2)

## 2018-03-23 NOTE — PROGRESS NOTES
"  SUBJECTIVE:   Ramses Spicer is a 42 year old male who presents to clinic today for the following health issues:    Chief Complaint   Patient presents with     Mouth Problem     Pt states that the back of his tongue is sore, he used to chew and this makes him feel worried.  x 1 week.      - pt presents to the clinic with concerns for pain and soreness on the back of his tongue   - trigger: pt bit his tongue before it started  - pt notes he has been worrying about this constantly   - pt notes due to being concerned patient is constantly looking at his tongue and pulling it out   - pt has concerns because he used to chew tobacco  - denies: lesion, swallowing changes, fever, night sweats, weight loss and coryza     Problem list and histories reviewed & adjusted, as indicated.  Additional history: as documented    Reviewed and updated as needed this visit by clinical staff  Tobacco  Allergies  Meds  Problems       Reviewed and updated as needed this visit by Provider  Meds  Problems             OBJECTIVE:     /62  Pulse 74  Temp 97.9  F (36.6  C) (Oral)  Resp 20  Ht 6' 2\" (1.88 m)  Wt (!) 376 lb 3.2 oz (170.6 kg)  SpO2 96%  BMI 48.3 kg/m2  Body mass index is 48.3 kg/(m^2).  GENERAL: healthy, alert and no distress  HENT: oropharynx clear and oral mucous membranes moist, tongue without lesions   NECK: no adenopathy, no asymmetry, masses, or scars and thyroid normal to palpation    Diagnostic Test Results:  none     ASSESSMENT/PLAN:       1. Tongue pain  -No identifiable cause found on exam today, reviewed with patient he should follow-up with his dentist  -Patient notes he is reassured that there is no lesion, and he thinks the pain is primarily due to his excessive worrying  -Further recommendation was made to try not to worry about the lesion and not to look at the area or irritate the area, and if pain persists for a week, then to follow-up with dentist  -If further concerns or change in symptoms, " follow-up    Patient agreed to the above plan and all questions are answered.     Juliette Bautista PA-C  [facility]

## 2018-03-23 NOTE — PATIENT INSTRUCTIONS
- try to avoid looking at the area or irritating the area   - if pain persist for 1 week from now, recommendation would be see your dentist   - follow up if symptoms worsen or change or if unable to see dentist

## 2018-03-23 NOTE — MR AVS SNAPSHOT
After Visit Summary   3/23/2018    Ramses Spicer    MRN: [id number]           Patient Information     Date Of Birth          1975        Visit Information        Provider Department      3/23/2018 7:20 AM Juliette Bautista PA-C [facility][1]        Care Instructions    - try to avoid looking at the area or irritating the area   - if pain persist for 1 week from now, recommendation would be see your dentist   - follow up if symptoms worsen or change or if unable to see dentist           Follow-ups after your visit        Who to contact     If you have questions or need follow up information about today's clinic visit or your schedule please contact [facility] directly at [phone].  Normal or non-critical lab and imaging results will be communicated to you by [employer], letter or phone within 4 business days after the clinic has received the results. If you do not hear from us within 7 days, please contact the clinic through [employer] or phone. If you have a critical or abnormal lab result, we will notify you by phone as soon as possible.  Submit refill requests through [employer] or call your pharmacy and they will forward the refill request to us. Please allow 3 business days for your refill to be completed.          Additional Information About Your Visit        MyChart Information     [employer] gives you secure access to your electronic health record. If you see a primary care provider, you can also send messages to your care team and make appointments. If you have questions, please call your primary care clinic.  If you do not have a primary care provider, please call [phone] and they will assist you.        Care EveryWhere ID     This is your Care EveryWhere ID. This could be used by other organizations to access your [facility] medical records  [id number]        Your Vitals Were     Pulse Temperature Respirations Height Pulse Oximetry BMI  "(Body Mass Index)    74 97.9  F (36.6  C) (Oral) 20 6' 2\" (1.88 m) 96% 48.3 kg/m2       Blood Pressure from Last 3 Encounters:   03/23/18 124/62   01/09/18 114/72   10/04/17 150/73    Weight from Last 3 Encounters:   03/23/18 (!) 376 lb 3.2 oz (170.6 kg)   01/09/18 (!) 378 lb 3.2 oz (171.6 kg)   10/04/17 (!) 368 lb 2.7 oz (167 kg)              Today, you had the following     No orders found for display       Primary Care Provider Office Phone # Fax #    Gael Grant [phone]       [address] [address]        Equal Access to Services     GLADYS EDOUARD : Hadii aad ku hadasho Soprashanth, waaxda luqadaha, qaybta kaalmada adelaurenyada, govind muniz . So [facility] [phone].    ATENCIÓN: Si habla español, tiene a byers disposición servicios gratuitos de asistencia lingüística. [facility] [phone].    We comply with applicable federal civil rights laws and [place] laws. We do not discriminate on the basis of race, color, national origin, age, disability, sex, sexual orientation, or gender identity.            Thank you!     Thank you for choosing [facility]  for your care. Our goal is always to provide you with excellent care. Hearing back from our patients is one way we can continue to improve our services. Please take a few minutes to complete the written survey that you may receive in the mail after your visit with us. Thank you!             Your Updated Medication List - Protect others around you: Learn how to safely use, store and throw away your medicines at www.disposemymeds.org.          This list is accurate as of 3/23/18  7:42 AM.  Always use your most recent med list.                   Brand Name Dispense Instructions for use Diagnosis    CLARITIN 10 MG tablet   Generic drug:  loratadine      1 TAB PO QD (Once per day) as needed for ALLERGY SYMPTOMS        PRILOSEC OTC PO      1 tablet daily          "

## 2018-03-24 ASSESSMENT — PATIENT HEALTH QUESTIONNAIRE - PHQ9: SUM OF ALL RESPONSES TO PHQ QUESTIONS 1-9: 1

## 2018-03-24 ASSESSMENT — ANXIETY QUESTIONNAIRES: GAD7 TOTAL SCORE: 4

## 2018-06-01 ENCOUNTER — OFFICE VISIT (OUTPATIENT)
Dept: INTERNAL MEDICINE | Facility: CLINIC | Age: 43
End: 2018-06-01
Payer: COMMERCIAL

## 2018-06-01 VITALS
HEIGHT: 74 IN | WEIGHT: 315 LBS | DIASTOLIC BLOOD PRESSURE: 84 MMHG | HEART RATE: 60 BPM | BODY MASS INDEX: 40.43 KG/M2 | TEMPERATURE: 98 F | SYSTOLIC BLOOD PRESSURE: 120 MMHG | RESPIRATION RATE: 16 BRPM

## 2018-06-01 DIAGNOSIS — K13.79 MOUTH SORES: Primary | ICD-10-CM

## 2018-06-01 PROCEDURE — 99213 OFFICE O/P EST LOW 20 MIN: CPT | Performed by: PHYSICIAN ASSISTANT

## 2018-06-01 ASSESSMENT — PAIN SCALES - GENERAL: PAINLEVEL: NO PAIN (0)

## 2018-06-01 NOTE — PROGRESS NOTES
"  SUBJECTIVE:   Ramses Spicer is a 42 year old male who presents to clinic today for the following health issues:      Pt is here today because he had some tongue soreness, but has no resolved. He states that he used to chew and every time he see or feels something on his tongue he worries.   Patient has had some worries in the past about sore areas on the tongue. States he did bite the back right side of the tongue. No pain now and has not had any pain for several days.  Routine dental visits 6 q months and they check his tongue then too.   Seen in clinic 3/2018 for similar concern. - bit tongue then too. No lesions found.    PMH of chewing tobacco use.         -------------------------------------    Problem list and histories reviewed & adjusted, as indicated.  Additional history: as documented    Labs reviewed in EPIC    Reviewed and updated as needed this visit by clinical staff  Tobacco  Allergies  Meds       Reviewed and updated as needed this visit by Provider  Allergies  Meds         ROS:  Constitutional, HEENT, cardiovascular, pulmonary, gi and gu systems are negative, except as otherwise noted.    OBJECTIVE:     /84 (BP Location: Left arm, Patient Position: Chair, Cuff Size: Adult Large)  Pulse 60  Temp 98  F (36.7  C) (Oral)  Resp 16  Ht 6' 2\" (1.88 m)  Wt (!) 381 lb 14.4 oz (173.2 kg)  BMI 49.03 kg/m2  Body mass index is 49.03 kg/(m^2).  GENERAL: healthy, alert and no distress  HENT: normal cephalic/atraumatic, nose and mouth without ulcers or lesions, oropharynx clear and oral mucous membranes moist  NECK: no adenopathy, no asymmetry, masses, or scars and thyroid normal to palpation  RESP: lungs clear to auscultation - no rales, rhonchi or wheezes  CV: regular rates and rhythm and normal S1 S2, no S3 or S4  SKIN: no suspicious lesions or rashes    Diagnostic Test Results:  none     ASSESSMENT/PLAN:             1. Mouth sores  Reassurance given normal oral exam  Continue to follow with " regular dental visits.            Nathaly Pitt PA-C  [facility]

## 2018-06-01 NOTE — MR AVS SNAPSHOT
"              After Visit Summary   6/1/2018    Ramses Spicer    MRN: [id number]           Patient Information     Date Of Birth          1975        Visit Information        Provider Department      6/1/2018 8:00 AM Nathaly Pitt PA-C [facility]        Today's Diagnoses     Mouth sores    -  1       Follow-ups after your visit        Who to contact     If you have questions or need follow up information about today's clinic visit or your schedule please contact [facility] directly at [phone].  Normal or non-critical lab and imaging results will be communicated to you by [employer], letter or phone within 4 business days after the clinic has received the results. If you do not hear from us within 7 days, please contact the clinic through [employer] or phone. If you have a critical or abnormal lab result, we will notify you by phone as soon as possible.  Submit refill requests through [employer] or call your pharmacy and they will forward the refill request to us. Please allow 3 business days for your refill to be completed.          Additional Information About Your Visit        MyChart Information     [employer] gives you secure access to your electronic health record. If you see a primary care provider, you can also send messages to your care team and make appointments. If you have questions, please call your primary care clinic.  If you do not have a primary care provider, please call [phone] and they will assist you.        Care EveryWhere ID     This is your Care EveryWhere ID. This could be used by other organizations to access your [place] medical records  [id number]        Your Vitals Were     Pulse Temperature Respirations Height BMI (Body Mass Index)       60 98  F (36.7  C) (Oral) 16 6' 2\" (1.88 m) 49.03 kg/m2        Blood Pressure from Last 3 Encounters:   06/01/18 120/84   03/23/18 124/62   01/09/18 114/72    Weight from Last 3 " Encounters:   06/01/18 (!) 381 lb 14.4 oz (173.2 kg)   03/23/18 (!) 376 lb 3.2 oz (170.6 kg)   01/09/18 (!) 378 lb 3.2 oz (171.6 kg)              Today, you had the following     No orders found for display       Primary Care Provider Office Phone # Fax #    Gael Grant [phone] [address] [address]        Equal Access to Services     GLADYS EDOUARD : Hadii aad ku hadasho Soomaali, waaxda luqadaha, qaybta kaalmada adeegyada, waxay idiin hayaan adeeg kharacarlos muniz . So [facility] [phone].    ATENCIÓN: Si habla español, tiene a byers disposición servicios gratuitos de asistencia lingüística. Llame al [phone].    We comply with applicable federal civil rights laws and [place] laws. We do not discriminate on the basis of race, color, national origin, age, disability, sex, sexual orientation, or gender identity.            Thank you!     Thank you for choosing [facility]  for your care. Our goal is always to provide you with excellent care. Hearing back from our patients is one way we can continue to improve our services. Please take a few minutes to complete the written survey that you may receive in the mail after your visit with us. Thank you!             Your Updated Medication List - Protect others around you: Learn how to safely use, store and throw away your medicines at www.disposemymeds.org.          This list is accurate as of 6/1/18  8:15 AM.  Always use your most recent med list.                   Brand Name Dispense Instructions for use Diagnosis    CLARITIN 10 MG tablet   Generic drug:  loratadine      1 TAB PO QD (Once per day) as needed for ALLERGY SYMPTOMS        PRILOSEC OTC PO      1 tablet daily        ZANTAC PO      Take 150 mg by mouth

## 2019-09-27 ENCOUNTER — ALLIED HEALTH/NURSE VISIT (OUTPATIENT)
Dept: NURSING | Facility: CLINIC | Age: 44
End: 2019-09-27
Payer: COMMERCIAL

## 2019-09-27 DIAGNOSIS — Z23 NEED FOR PROPHYLACTIC VACCINATION AND INOCULATION AGAINST INFLUENZA: Primary | ICD-10-CM

## 2019-09-27 PROCEDURE — 90686 IIV4 VACC NO PRSV 0.5 ML IM: CPT

## 2019-09-27 PROCEDURE — 90471 IMMUNIZATION ADMIN: CPT

## 2019-11-06 ENCOUNTER — HEALTH MAINTENANCE LETTER (OUTPATIENT)
Age: 44
End: 2019-11-06

## 2019-12-30 ENCOUNTER — OFFICE VISIT (OUTPATIENT)
Dept: INTERNAL MEDICINE | Facility: CLINIC | Age: 44
End: 2019-12-30
Payer: COMMERCIAL

## 2019-12-30 VITALS
HEIGHT: 74 IN | OXYGEN SATURATION: 96 % | SYSTOLIC BLOOD PRESSURE: 124 MMHG | RESPIRATION RATE: 18 BRPM | BODY MASS INDEX: 40.43 KG/M2 | HEART RATE: 66 BPM | DIASTOLIC BLOOD PRESSURE: 84 MMHG | WEIGHT: 315 LBS

## 2019-12-30 DIAGNOSIS — Z13.220 SCREENING FOR CHOLESTEROL LEVEL: ICD-10-CM

## 2019-12-30 DIAGNOSIS — Z00.00 ROUTINE HISTORY AND PHYSICAL EXAMINATION OF ADULT: Primary | ICD-10-CM

## 2019-12-30 DIAGNOSIS — Z13.1 SCREENING FOR DIABETES MELLITUS: ICD-10-CM

## 2019-12-30 DIAGNOSIS — E66.01 MORBIDLY OBESE (H): ICD-10-CM

## 2019-12-30 PROCEDURE — 99396 PREV VISIT EST AGE 40-64: CPT | Performed by: INTERNAL MEDICINE

## 2019-12-30 ASSESSMENT — MIFFLIN-ST. JEOR: SCORE: 2718.36

## 2019-12-30 NOTE — PROGRESS NOTES
SUBJECTIVE                                                      HPI: Ramses Spicer is a pleasant 44 year old male who presents for a physical.    No specific complaints, concerns, or questions.    ROS:  Constitutional: denies unintentional weight loss or gain; denies fevers, chills, or sweats     Cardiovascular: denies chest pain, palpitations, or edema  Respiratory: denies cough, wheezing, shortness of breath, or dyspnea on exertion  Gastrointestinal: denies nausea, vomiting, constipation, diarrhea, or abdominal pain  Genitourinary: denies urinary frequency, urgency, dysuria, or hematuria  Integumentary: denies rash or pruritus  Musculoskeletal: denies back pain, muscle pain, joint pain, or joint swelling  Neurologic: denies focal weakness, numbness, or tingling  Hematologic/Immunologic: denies history of anemia or blood transfusions  Endocrine: denies heat or cold intolerance; denies polyuria, polydipsia  Psychiatric: denies anxiety; see preventative health below    Past Medical History:   Diagnosis Date     Acid reflux disease      Morbidly obese (H)      Past Surgical History:   Procedure Laterality Date     EXCISE EXOSTOSIS FOOT Left 3/31/2015    Procedure: EXCISE EXOSTOSIS FOOT;  Surgeon: Camron Schwartz DPM;  Location: [facility]     GI SURGERY  infant    ?pylorotomy for pyloric stenosis     REPAIR TENDON ACHILLES Left 3/31/2015    Procedure: REPAIR TENDON ACHILLES;  Surgeon: Camron Schwartz DPM;  Location: [facility]     Family History   Problem Relation Age of Onset     Cerebrovascular Disease Father         early 60s     Pacemaker Father      Diabetes Type 2  Father         resolved with weight loss     Myocardial Infarction Paternal Grandmother         later in life     Myocardial Infarction Maternal Grandfather         in 60s     Pancreatic Cancer Maternal Grandmother      Coronary Artery Disease Early Onset No family hx of      Colon Cancer No family hx of      Prostate Cancer No family hx of   "    Social History     Occupational History     Occupation:      Comment: [employer]   Tobacco Use     Smoking status: Never Smoker     Smokeless tobacco: Former User   Substance and Sexual Activity     Alcohol use: Yes     Comment: 1 drink/week     Drug use: No     Sexual activity: Yes     Partners: Female   Social History Narrative    .    Daughter (21 months as of December, 2019; another one due May, 2020).    Stationary bike several times/week.     No Known Allergies     Current Outpatient Medications   Medication Sig     CLARITIN 10 MG OR TABS 1 TAB PO QD (Once per day) as needed for ALLERGY SYMPTOMS     PRILOSEC OTC PO 1 tablet daily     Immunization History   Administered Date(s) Administered     Influenza Vaccine IM > 6 months Valent IIV4 01/11/2016, 09/27/2019     Influenza Vaccine, 3 YRS +, IM (QUADRIVALENT W/PRESERVATIVES) 11/25/2014, 10/26/2016     TDAP Vaccine (Adacel) 08/29/2012, 12/19/2017     OBJECTIVE                                                      /84   Pulse 66   Resp 18   Ht 1.88 m (6' 2\")   Wt (!) 175.4 kg (386 lb 9.6 oz)   SpO2 96%   BMI 49.64 kg/m    Constitutional: well-appearing  Eyes: normal conjunctivae and lids; pupils equal, round, and reactive to light  Ears, Nose, Mouth, and Throat: normal ears and nose; tympanic membranes visualized and normal; normal lips, teeth, and gums; no oropharyngeal lesions or ulcers  Neck: supple and symmetric; no lymphadenopathy; no thyromegaly or masses  Respiratory: normal respiratory effort; clear to auscultation bilaterally  Cardiovascular: regular rate and rhythm; pedal pulses palpable; no edema  Gastrointestinal: soft, non-tender, non-distended, and bowel sounds present; no organomegaly or masses  Musculoskeletal: normal gait and station  Psych: normal judgment and insight; normal mood and affect; recent and remote memory intact; oriented to time, place, and person    PREVENTATIVE HEALTH                             " "                         Blood pressure: within normal limits   Prostate CA Screening: not medically indicated at this time   Colon CA screening: not medically indicated at this time   Lung CA screening: n/a   AAA screening: n/a   Screening HCV: n/a   Screening HIV: completed  Screening cholesterol: DUE  Screening diabetes: DUE  STD testing: no risk factors present  Depression screening: PHQ-2 assessment completed and reviewed - no intervention indicated at this time  Alcohol misuse screening: alcohol use reviewed - no intervention indicated at this time  Immunizations: reviewed; up to date      ASSESSMENT/PLAN                                                       (Z00.00) Routine history and physical examination of adult  (primary encounter diagnosis)  Comment: PMH, PSH, FH, SH, medications, allergies, immunizations, and preventative health measures reviewed.   Plan: see below for plans.    (Z13.220) Screening for cholesterol level  (Z13.1) Screening for diabetes mellitus  Plan: patient will return for fasting labs when able.     (E66.01) Morbidly obese (H)  Comment: discussed the following with patient...    Morbid obesity is when your BMI exceeds 40 or exceeds 35 in the setting of diabetes, high blood pressure, or obstructive sleep apnea.     The term \"morbid\" is not meant to shame patients but rather to highlight the increased risk of illness, life-threatening conditions, and early death that come with this diagnosis.     Individuals with morbid obesity are at a much higher risk for developing numerous illnesses including type 2 diabetes, high blood pressure, heart disease, obstructive sleep apnea, gallbladder disease and gallstones, liver disease, acid reflux/heartburn, menstrual irregularities, female infertility, stress urinary incontinence, depression, and osteoarthritis.     Obesity also increases the risk of certain cancers. Obese men are more likely than non-obese men to die from colon, rectal, or " prostate cancers. Obese women are more likely than non-obese women to die from gallbladder, breast, uterus, cervical, or ovarian cancer.    Finally, individuals with morbid obesity are at higher risk for  premature death (a 20-year shorter life span).     It's not easy to lose weight but your life depends on it. Please adhere to a heart healthy diet and regular exercise (exercise = cardiovascularly vigorous exercise like running, elliptical, or swimming) at least 30 minutes/day, 5 days/week. Weight Watchers is a great program for weight loss. [place] also has it's own weight loss program if you would like a referral.     The instructions on the AVS were discussed and explained to the patient. Patient expressed understanding of instructions.    (Chart documentation was completed, in part, with [employer] voice-recognition software. Even though reviewed, some grammatical, spelling, and word errors may remain.)    Rakel Kwan MD   [address] [address]  T: [phone], F: [phone]

## 2019-12-30 NOTE — PATIENT INSTRUCTIONS
"Iesha Michelle.    ---    Schedule fasting labs when able.    ---    Body mass index is 49.64 kg/m .     Morbid obesity is when your BMI exceeds 40 or exceeds 35 in the setting of diabetes, high blood pressure, or obstructive sleep apnea.     The term \"morbid\" is not meant to shame patients but rather to highlight the increased risk of illness, life-threatening conditions, and early death that come with this diagnosis.     Individuals with morbid obesity are at a much higher risk for developing numerous illnesses including type 2 diabetes, high blood pressure, heart disease, obstructive sleep apnea, gallbladder disease and gallstones, liver disease, acid reflux/heartburn, menstrual irregularities, female infertility, stress urinary incontinence, depression, and osteoarthritis.     Obesity also increases the risk of certain cancers. Obese men are more likely than non-obese men to die from colon, rectal, or prostate cancers. Obese women are more likely than non-obese women to die from gallbladder, breast, uterus, cervical, or ovarian cancer.    Finally, individuals with morbid obesity are at higher risk for  premature death (a 20-year shorter life span).     It's not easy to lose weight but your life depends on it. Please adhere to a heart healthy diet and regular exercise (exercise = cardiovascularly vigorous exercise like running, elliptical, or swimming) at least 30 minutes/day, 5 days/week. Weight Watchers is a great program for weight loss. [place] also has it's own weight loss program if you would like a referral.         "

## 2020-11-24 ENCOUNTER — OFFICE VISIT (OUTPATIENT)
Dept: PODIATRY | Facility: CLINIC | Age: 45
End: 2020-11-24
Payer: COMMERCIAL

## 2020-11-24 VITALS
SYSTOLIC BLOOD PRESSURE: 136 MMHG | DIASTOLIC BLOOD PRESSURE: 74 MMHG | WEIGHT: 315 LBS | BODY MASS INDEX: 39.17 KG/M2 | HEIGHT: 75 IN

## 2020-11-24 DIAGNOSIS — M79.675 TOE PAIN, BILATERAL: Primary | ICD-10-CM

## 2020-11-24 DIAGNOSIS — E66.01 MORBIDLY OBESE (H): ICD-10-CM

## 2020-11-24 DIAGNOSIS — M79.674 TOE PAIN, BILATERAL: Primary | ICD-10-CM

## 2020-11-24 DIAGNOSIS — Q66.70 PES CAVUS: ICD-10-CM

## 2020-11-24 DIAGNOSIS — M72.2 BILATERAL PLANTAR FASCIITIS: ICD-10-CM

## 2020-11-24 PROCEDURE — 99203 OFFICE O/P NEW LOW 30 MIN: CPT | Performed by: PODIATRIST

## 2020-11-24 ASSESSMENT — MIFFLIN-ST. JEOR: SCORE: 2745.12

## 2020-11-24 NOTE — LETTER
11/24/2020         RE: Ramses Spicer  [address]  [place] [address]        Dear Colleague,    Thank you for referring your patient, Ramses Spicer, to the [facility]. Please see a copy of my visit note below.      ASSESSMENT/PLAN:  Encounter Diagnoses   Name Primary?     Toe pain, bilateral Yes     history of Bilateral plantar fasciitis      Pes cavus      Morbidly obese (H)      Ramses Spicer is referred to [facility] for new prescription orthoses.  They help keep him from having heel pain and I consider them preventive, given his body weight.     Using a nail nippers, I trimmed the distal lateral nail corners, bilateral hallux. I did this do demonstrate what he can do at home.  He plans on purchasing a nail nippers.    We discussed the partial nail removal procedure in detail, including the post procedure course for both the avulsion procedure and partial chemical matrixectomy.      Camron Schwartz DPM, FACFAS, MS    [facility]      ____________________________________________________________________    HPI:         Chief Complaint: ingrown toenails  Onset of problem: years  He has occasional pain along the lateral skin folds, bilateral hallux  His pain is more distal.   He keeps them comfortable by trimming away callused skin  He inquires about what is involved with a nail procedure.    Ramses also requests a referral for new orthoses. They were prescribed for plantar fasciitis and help keep him pain free.  *  Patient Active Problem List   Diagnosis     Acid reflux disease     Morbidly obese (H)   *  *  Past Surgical History:   Procedure Laterality Date     EXCISE EXOSTOSIS FOOT Left 3/31/2015    Procedure: EXCISE EXOSTOSIS FOOT;  Surgeon: Camron Schwartz DPM;  Location: [facility]     GI SURGERY  infant    ?pylorotomy for pyloric stenosis     REPAIR TENDON ACHILLES Left 3/31/2015    Procedure: REPAIR  TENDON ACHILLES;  Surgeon: Camron Schwartz DPM;  Location: [facility]   *  *  Current Outpatient Medications   Medication Sig Dispense Refill     CLARITIN 10 MG OR TABS 1 TAB PO QD (Once per day) as needed for ALLERGY SYMPTOMS       PRILOSEC OTC PO 1 tablet daily         ROS:     A 10-point review of systems was performed and is positive for that noted above in the HPI and as seen below.  All other areas are negative.     Numbness in feet?  no   Calf pain with walking? no  Weight change over past 12 months? no  Self perception as overweight? yes  Recent flu-like symptoms? no  Joint pain other than feet ? no    Social History:  Social History     Socioeconomic History     Marital status:      Spouse name: Not on file     Number of children: 0     Years of education: 16     Highest education level: Not on file   Occupational History     Occupation:      Comment: [employer]   Social Needs     Financial resource strain: Not on file     Food insecurity     Worry: Not on file     Inability: Not on file     Transportation needs     Medical: Not on file     Non-medical: Not on file   Tobacco Use     Smoking status: Never Smoker     Smokeless tobacco: Former User   Substance and Sexual Activity     Alcohol use: Yes     Comment: 1 drink/week     Drug use: No     Sexual activity: Yes     Partners: Female   Lifestyle     Physical activity     Days per week: Not on file     Minutes per session: Not on file     Stress: Not on file   Relationships     Social connections     Talks on phone: Not on file     Gets together: Not on file     Attends Voodoo service: Not on file     Active member of club or organization: Not on file     Attends meetings of clubs or organizations: Not on file     Relationship status: Not on file     Intimate partner violence     Fear of current or ex partner: Not on file     Emotionally abused: Not on file     Physically abused: Not on file     Forced sexual activity: Not on file  "  Other Topics Concern     Parent/sibling w/ CABG, MI or angioplasty before 65F 55M? Not Asked   Social History Narrative    .    Daughter (21 months as of December, 2019; another one due May, 2020).    Stationary bike several times/week.       Family history:  Family History   Problem Relation Age of Onset     Cerebrovascular Disease Father         early 60s     Pacemaker Father      Diabetes Type 2  Father         resolved with weight loss     Myocardial Infarction Paternal Grandmother         later in life     Myocardial Infarction Maternal Grandfather         in 60s     Pancreatic Cancer Maternal Grandmother      Coronary Artery Disease Early Onset No family hx of      Colon Cancer No family hx of      Prostate Cancer No family hx of        EXAM:    Vitals: /74   Ht 1.905 m (6' 3\")   Wt (!) 176.4 kg (389 lb)   BMI 48.62 kg/m    BMI: Body mass index is 48.62 kg/m .  Height: 6' 3\"    Constitutional/ general:  Pt is in no apparent distress, appears well-nourished.  Cooperative with history and physical exam.     Vascular:  Pedal pulses are palpable bilaterally for both the DP and PT arteries.  CFT < 3 sec.  No edema.  Pedal hair growth noted.     Neuro:  Alert and oriented x 3. Coordinated gait.  Light touch sensation is intact to the L4, L5, S1 distributions. No obvious deficits.  No evidence of neurological-based weakness, spasticity, or contracture in the lower extremities.     Derm: Normal texture and turgor.  No erythema, ecchymosis, or cyanosis.  No open lesions.   Bilateral hallux nails incurvate. No associated erythema, edema, drainage or pain today.    Musculoskeletal:    Lower extremity muscle strength is normal.  Patient is ambulatory without an assistive device or brace .  No gross deformities.  High medial longitudinal arches.            Again, thank you for allowing me to participate in the care of your patient.        Sincerely,        Camron Schwartz, WOODY    "

## 2020-11-24 NOTE — PROGRESS NOTES
ASSESSMENT/PLAN:  Encounter Diagnoses   Name Primary?     Toe pain, bilateral Yes     history of Bilateral plantar fasciitis      Pes cavus      Morbidly obese (H)      Ramses Spicer is referred to [facility] for new prescription orthoses.  They help keep him from having heel pain and I consider them preventive, given his body weight.     Using a nail nippers, I trimmed the distal lateral nail corners, bilateral hallux. I did this do demonstrate what he can do at home.  He plans on purchasing a nail nippers.    We discussed the partial nail removal procedure in detail, including the post procedure course for both the avulsion procedure and partial chemical matrixectomy.      Camron Schwartz DPM, FACFAS, MS    [facility]      ____________________________________________________________________    HPI:         Chief Complaint: ingrown toenails  Onset of problem: years  He has occasional pain along the lateral skin folds, bilateral hallux  His pain is more distal.   He keeps them comfortable by trimming away callused skin  He inquires about what is involved with a nail procedure.    Ramses also requests a referral for new orthoses. They were prescribed for plantar fasciitis and help keep him pain free.  *  Patient Active Problem List   Diagnosis     Acid reflux disease     Morbidly obese (H)   *  *  Past Surgical History:   Procedure Laterality Date     EXCISE EXOSTOSIS FOOT Left 3/31/2015    Procedure: EXCISE EXOSTOSIS FOOT;  Surgeon: Camron Schwartz DPM;  Location: [facility]     GI SURGERY  infant    ?pylorotomy for pyloric stenosis     REPAIR TENDON ACHILLES Left 3/31/2015    Procedure: REPAIR TENDON ACHILLES;  Surgeon: Camron Schwartz DPM;  Location: [facility]   *  *  Current Outpatient Medications   Medication Sig Dispense Refill     CLARITIN 10 MG OR TABS 1 TAB PO QD (Once per day) as needed for ALLERGY SYMPTOMS       PRILOSEC OTC PO 1 tablet daily          ROS:     A 10-point review of systems was performed and is positive for that noted above in the HPI and as seen below.  All other areas are negative.     Numbness in feet?  no   Calf pain with walking? no  Weight change over past 12 months? no  Self perception as overweight? yes  Recent flu-like symptoms? no  Joint pain other than feet ? no    Social History:  Social History     Socioeconomic History     Marital status:      Spouse name: Not on file     Number of children: 0     Years of education: 16     Highest education level: Not on file   Occupational History     Occupation:      Comment: [employer]   Social Needs     Financial resource strain: Not on file     Food insecurity     Worry: Not on file     Inability: Not on file     Transportation needs     Medical: Not on file     Non-medical: Not on file   Tobacco Use     Smoking status: Never Smoker     Smokeless tobacco: Former User   Substance and Sexual Activity     Alcohol use: Yes     Comment: 1 drink/week     Drug use: No     Sexual activity: Yes     Partners: Female   Lifestyle     Physical activity     Days per week: Not on file     Minutes per session: Not on file     Stress: Not on file   Relationships     Social connections     Talks on phone: Not on file     Gets together: Not on file     Attends Islam service: Not on file     Active member of club or organization: Not on file     Attends meetings of clubs or organizations: Not on file     Relationship status: Not on file     Intimate partner violence     Fear of current or ex partner: Not on file     Emotionally abused: Not on file     Physically abused: Not on file     Forced sexual activity: Not on file   Other Topics Concern     Parent/sibling w/ CABG, MI or angioplasty before 65F 55M? Not Asked   Social History Narrative    .    Daughter (21 months as of December, 2019; another one due May, 2020).    Stationary bike several times/week.       Family  "history:  Family History   Problem Relation Age of Onset     Cerebrovascular Disease Father         early 60s     Pacemaker Father      Diabetes Type 2  Father         resolved with weight loss     Myocardial Infarction Paternal Grandmother         later in life     Myocardial Infarction Maternal Grandfather         in 60s     Pancreatic Cancer Maternal Grandmother      Coronary Artery Disease Early Onset No family hx of      Colon Cancer No family hx of      Prostate Cancer No family hx of        EXAM:    Vitals: /74   Ht 1.905 m (6' 3\")   Wt (!) 176.4 kg (389 lb)   BMI 48.62 kg/m    BMI: Body mass index is 48.62 kg/m .  Height: 6' 3\"    Constitutional/ general:  Pt is in no apparent distress, appears well-nourished.  Cooperative with history and physical exam.     Vascular:  Pedal pulses are palpable bilaterally for both the DP and PT arteries.  CFT < 3 sec.  No edema.  Pedal hair growth noted.     Neuro:  Alert and oriented x 3. Coordinated gait.  Light touch sensation is intact to the L4, L5, S1 distributions. No obvious deficits.  No evidence of neurological-based weakness, spasticity, or contracture in the lower extremities.     Derm: Normal texture and turgor.  No erythema, ecchymosis, or cyanosis.  No open lesions.   Bilateral hallux nails incurvate. No associated erythema, edema, drainage or pain today.    Musculoskeletal:    Lower extremity muscle strength is normal.  Patient is ambulatory without an assistive device or brace .  No gross deformities.  High medial longitudinal arches.        "

## 2020-11-24 NOTE — PATIENT INSTRUCTIONS
"Thank you for choosing [facility] / [facility]!    DR. SOLOMON[facility]S CLINIC LOCATIONS     [facility] SURGERY: [phone]   [address] [place] [address] APPOINTMENTS: [phone]   [place] [address] BILLING QUESTIONS: [phone]  [phone] RADIOLOGY: [phone]       [facility]    [address]    [place], [place] [id number] [phone]      Follow up: as needed    Next steps: look into buying a \"nail nipper\"    Please read through the following handouts and if you have any questions, please feel free to call us or send a [facility] [employer] message!    [facility] LOCATIONS  [facility]  [address]  [place] [address]  Phone: [phone]  Fax: [phone] [facility]  [address]  [place] [address]  Phone: [phone]   Fax: [phone]   [facility]  [address]  [place] [address]  Phone: [phone]  Fax: [phone] [facility]  [address]  [place] [address]  Phone: [phone]   Fax: [phone]   [facility]   [address]  [place] [address]  Phone: [phone]  Fax: [phone] * Please call any location listed to make an appointment for a casting/fitting. Your referral was sent to their central office and they will all have the order on file.     WEARING YOUR CUSTOM FOOT ORTHOTICS   Most insurance plans cover one pair of orthotics per year. You must check with your   insurance plan to see what your payment responsibility will be. Please call your   insurance company by calling the number on the back of your insurance card.   Orthotic's are non-refundable and non-returnable.   Orthotics are made of various designs. Some orthotics are covered with material that extends beyond your toes. If your orthotic is of this design, you will likely need to " trim the toe end to get a proper fit. The insole from your shoe can be used as a template. Simply overlay the shoe insert on top of the custom orthotic. Align the heel end while tracing the length of the insert onto the custom orthotic. Use a large scissor to trim the toe end until you get a proper fit in the shoe.   The orthotic needs to be pushed as far back in the shoe as possible. The heel portion should not ride forward so as not to irritate your heel.   Orthotics are designed to work with socks. Excessive perspiration will shorten the life span of the orthotics. Remove the orthotic from the shoe frequently for proper drying.   The break-in period lasts for weeks. People new to orthotics will likely experience new aches and pains. The orthotic is forcing your foot into a new position. Arch, foot and leg muscle aches and fatigue are common during these weeks. Minor discomfort can be considered normal break in phenomenon. Start wearing your orthotic around your home your first day. Limited activity for one to two hours is recommended. You can increase one or two additional hours each day provided the aches and pains are subsiding. The degree of discomfort, fatigue and problems will dictate the speed of break in. You may require multiple weeks to work up to full time use.   Do not continue wearing your orthotics if they are creating problems such as blisters or sores. Do not hesitate to call the clinic to speak with a nurse regarding orthotic   break in, fit, trimming, etc. You may also need to see the doctor if the orthotics are   simply not working out. Adjustments are sometimes made to improve orthotic   function.     Orthotics will only work in certain styles and types of shoes. Orthotics rarely work in dress shoes. Slip-ons, clogs, sandals and heels are particularly troublesome. Specially designed orthotics may be necessary for these types of shoes. Your custom orthotic was designed for activities that  require appropriate walking or running shoes. Lace up athletic shoes, walking shoes or work boots should work appropriately. You may need a wider or longer shoe. Shoes with a removable  or insert work best. In general, you want to remove an insert from the shoe before placing the orthotic into the shoe. Shoes without a removable liner may not work as well.     When purchasing new shoes, bring your orthotics along to get a proper fit. Shop at stores that are familiar with orthotics.   Frequent washing of the orthotic may shorten the life span of the top cover. The top cover can be replaced but will generally last one to five years depending on use and foot perspiration.

## 2021-02-14 ENCOUNTER — HEALTH MAINTENANCE LETTER (OUTPATIENT)
Age: 46
End: 2021-02-14

## 2021-04-18 ENCOUNTER — VIRTUAL VISIT (OUTPATIENT)
Dept: URGENT CARE | Facility: CLINIC | Age: 46
End: 2021-04-18
Payer: COMMERCIAL

## 2021-04-18 DIAGNOSIS — Z20.822 EXPOSURE TO COVID-19 VIRUS: Primary | ICD-10-CM

## 2021-04-18 PROCEDURE — 99212 OFFICE O/P EST SF 10 MIN: CPT

## 2021-04-18 NOTE — PROGRESS NOTES
Ramses is a 45 year old who is being evaluated via a billable telephone visit.      Assessment & Plan     Exposure to COVID-19 virus  - Asymptomatic COVID-19 Virus (Coronavirus) by PCR; Future    Katia Aponte PA-C  Virtual Urgent Care  [facility] VIRTUAL URGENT CARE    Subjective   Ramses is a 45 year old who presents for the following health issues : covid exposure    HPI - patients wife reports that there was a case of covid at their daughters  and it was suggested they get tested 5 days after exposure. Ramses is having no symptoms.    Review of Systems   Constitutional, HEENT, cardiovascular, pulmonary, gi and gu systems are negative, except as otherwise noted.      Objective           Vitals:  No vitals were obtained today due to virtual visit.    Physical Exam   No exam done      Phone call duration: 2 minutes

## 2021-04-19 DIAGNOSIS — Z20.822 EXPOSURE TO COVID-19 VIRUS: ICD-10-CM

## 2021-04-19 LAB
LABORATORY COMMENT REPORT: NORMAL
SARS-COV-2 RNA RESP QL NAA+PROBE: NEGATIVE
SARS-COV-2 RNA RESP QL NAA+PROBE: NORMAL
SPECIMEN SOURCE: NORMAL
SPECIMEN SOURCE: NORMAL

## 2021-04-19 PROCEDURE — U0005 INFEC AGEN DETEC AMPLI PROBE: HCPCS | Performed by: PHYSICIAN ASSISTANT

## 2021-04-19 PROCEDURE — U0003 INFECTIOUS AGENT DETECTION BY NUCLEIC ACID (DNA OR RNA); SEVERE ACUTE RESPIRATORY SYNDROME CORONAVIRUS 2 (SARS-COV-2) (CORONAVIRUS DISEASE [COVID-19]), AMPLIFIED PROBE TECHNIQUE, MAKING USE OF HIGH THROUGHPUT TECHNOLOGIES AS DESCRIBED BY CMS-2020-01-R: HCPCS | Performed by: PHYSICIAN ASSISTANT

## 2021-08-30 ENCOUNTER — HOSPITAL ENCOUNTER (EMERGENCY)
Facility: CLINIC | Age: 46
Discharge: HOME | End: 2021-08-30
Attending: EMERGENCY MEDICINE | Admitting: EMERGENCY MEDICINE
Payer: COMMERCIAL

## 2021-08-30 VITALS
HEART RATE: 75 BPM | BODY MASS INDEX: 39.17 KG/M2 | TEMPERATURE: 97.6 F | WEIGHT: 315 LBS | DIASTOLIC BLOOD PRESSURE: 70 MMHG | OXYGEN SATURATION: 95 % | RESPIRATION RATE: 16 BRPM | SYSTOLIC BLOOD PRESSURE: 135 MMHG | HEIGHT: 75 IN

## 2021-08-30 DIAGNOSIS — K20.80 PILL ESOPHAGITIS: ICD-10-CM

## 2021-08-30 DIAGNOSIS — T50.905A PILL ESOPHAGITIS: ICD-10-CM

## 2021-08-30 PROCEDURE — 99283 EMERGENCY DEPT VISIT LOW MDM: CPT

## 2021-08-30 ASSESSMENT — MIFFLIN-ST. JEOR: SCORE: 2699.3

## 2021-08-30 NOTE — ED PROVIDER NOTES
"  History   Chief Complaint:  Swallowed Foreign Body       HPI   Ramses Spicer is a 45 year old male who presents with a feeling of a lozenge stuck in his throat at 3a this morning. The patient was able to eat a couple bites of cereal but got nauseous after. He is able to drink water and talk fine. This has happened before.       Review of Systems   Respiratory: Negative for cough and shortness of breath.    Cardiovascular: Negative for chest pain.   Gastrointestinal: Negative for abdominal pain, nausea and vomiting.   All other systems reviewed and are negative.        Allergies:  The patient has no known allergies.     Medications:  Claritin  Prilosec    Past Medical History:    Acid reflux disease  Obesity    Past Surgical History:    Excise exotosis foot  GI surgery  Repair tendon achilles    Family History:    Cerebrovascular disease  Pacemaker  DM    Social History:  The patient presents alone.    Physical Exam     Patient Vitals for the past 24 hrs:   BP Temp Temp src Pulse Resp SpO2 Height Weight   08/30/21 0520 135/70 97.6  F (36.4  C) Temporal 75 16 95 % 1.905 m (6' 3\") (!) 172.4 kg (380 lb)       Physical Exam  General: Appears well-developed and well-nourished.   Head: No signs of trauma.   Mouth/Throat: Oropharynx is clear and moist.   Neck: Normal range of motion. No nuchal rigidity. No cervical adenopathy  CV: Normal rate and regular rhythm.    Resp: Effort normal and breath sounds normal. No respiratory distress.   MSK: Normal range of motion.   Neuro: The patient is alert and oriented. Speech normal.  Skin: Skin is warm and dry. No rash noted.   Psych: normal mood and affect. behavior is normal.       Emergency Department Course     Emergency Department Course:    Reviewed:  I reviewed nursing notes, vitals, past medical history and care everywhere    Assessments:  0527 I obtained history and examined the patient as noted above.     0601 I rechecked the patient and explained findings. "     Disposition:  The patient was discharged to home.       Impression & Plan     Medical Decision Making:  Ramses Spicer is a 45-year-old gentleman who presents due to a feeling of having a lozenge stuck in his throat. He states that he had woken up and had a feeling of dry mouth and had to put a lozenge in his mouth. He then fallen asleep and believes that he had swallowed the lozenge. This morning he had a foreign body sensation in his throat and when he tried to eat he was able to do so but had some discomfort with it. He has not had any difficulty breathing or drinking fluids. In my evaluation patient was speaking in full sentences and the upper airway was clear. Patient had the same presentation in the past and at that time a fiberoptic scope had been used to evaluate the upper esophagus. Patient did request this again. Unfortunately the nasal fiberoptic scope was not available. While the patient was waiting he had resolution of his symptoms. Clinically I do not suspect continued foreign body but rather I believes he has some irritation to the throat causing his symptoms given his ability to eat and drink. Patient was given omeprazole and recommended follow-up with his doctor and return for any further concerns.    Diagnosis:    ICD-10-CM    1. Pill esophagitis  K20.80     T50.905A        Discharge Medications:  New Prescriptions    OMEPRAZOLE (PRILOSEC) 20 MG DR CAPSULE    Take 1 capsule (20 mg) by mouth daily for 15 days       Scribe Disclosure:  Go CHANEL, am serving as a scribe at 5:28 AM on 8/30/2021 to document services personally performed by Derrick Vinson MD based on my observations and the provider's statements to me.          Derrick Vinson MD  08/31/21 0716

## 2021-08-30 NOTE — ED TRIAGE NOTES
Pt says he swallowed a lozenge earlier and now it feels like it is stuck in his throat. He is able to keep down food and fluids.

## 2021-08-31 ASSESSMENT — ENCOUNTER SYMPTOMS
SHORTNESS OF BREATH: 0
ABDOMINAL PAIN: 0
COUGH: 0
NAUSEA: 0
VOMITING: 0

## 2021-09-19 ENCOUNTER — HEALTH MAINTENANCE LETTER (OUTPATIENT)
Age: 46
End: 2021-09-19

## 2022-01-28 ENCOUNTER — E-VISIT (OUTPATIENT)
Dept: INTERNAL MEDICINE | Facility: CLINIC | Age: 47
End: 2022-01-28
Payer: COMMERCIAL

## 2022-01-28 DIAGNOSIS — J06.9 UPPER RESPIRATORY TRACT INFECTION, UNSPECIFIED TYPE: Primary | ICD-10-CM

## 2022-01-28 PROCEDURE — 99421 OL DIG E/M SVC 5-10 MIN: CPT | Performed by: INTERNAL MEDICINE

## 2022-03-06 ENCOUNTER — HEALTH MAINTENANCE LETTER (OUTPATIENT)
Age: 47
End: 2022-03-06

## 2022-04-15 ENCOUNTER — OFFICE VISIT (OUTPATIENT)
Dept: INTERNAL MEDICINE | Facility: CLINIC | Age: 47
End: 2022-04-15
Payer: COMMERCIAL

## 2022-04-15 VITALS
BODY MASS INDEX: 39.17 KG/M2 | SYSTOLIC BLOOD PRESSURE: 112 MMHG | TEMPERATURE: 97.3 F | OXYGEN SATURATION: 97 % | HEART RATE: 75 BPM | HEIGHT: 75 IN | DIASTOLIC BLOOD PRESSURE: 80 MMHG | WEIGHT: 315 LBS

## 2022-04-15 DIAGNOSIS — R09.81 NASAL CONGESTION: ICD-10-CM

## 2022-04-15 DIAGNOSIS — Z00.00 ROUTINE GENERAL MEDICAL EXAMINATION AT A HEALTH CARE FACILITY: ICD-10-CM

## 2022-04-15 DIAGNOSIS — K21.9 GASTROESOPHAGEAL REFLUX DISEASE WITHOUT ESOPHAGITIS: Primary | ICD-10-CM

## 2022-04-15 DIAGNOSIS — Z13.220 SCREENING FOR HYPERLIPIDEMIA: ICD-10-CM

## 2022-04-15 DIAGNOSIS — E66.01 MORBIDLY OBESE (H): ICD-10-CM

## 2022-04-15 DIAGNOSIS — Z12.11 SCREEN FOR COLON CANCER: ICD-10-CM

## 2022-04-15 DIAGNOSIS — Z11.59 NEED FOR HEPATITIS C SCREENING TEST: ICD-10-CM

## 2022-04-15 LAB
CHOLEST SERPL-MCNC: 118 MG/DL
FASTING STATUS PATIENT QL REPORTED: YES
FASTING STATUS PATIENT QL REPORTED: YES
GLUCOSE BLD-MCNC: 96 MG/DL (ref 70–99)
HCV AB SERPL QL IA: NONREACTIVE
HDLC SERPL-MCNC: 33 MG/DL
LDLC SERPL CALC-MCNC: 73 MG/DL
NONHDLC SERPL-MCNC: 85 MG/DL
TRIGL SERPL-MCNC: 61 MG/DL

## 2022-04-15 PROCEDURE — 80061 LIPID PANEL: CPT | Performed by: NURSE PRACTITIONER

## 2022-04-15 PROCEDURE — 86803 HEPATITIS C AB TEST: CPT | Performed by: NURSE PRACTITIONER

## 2022-04-15 PROCEDURE — 99386 PREV VISIT NEW AGE 40-64: CPT | Performed by: NURSE PRACTITIONER

## 2022-04-15 PROCEDURE — 36415 COLL VENOUS BLD VENIPUNCTURE: CPT | Performed by: NURSE PRACTITIONER

## 2022-04-15 PROCEDURE — 82947 ASSAY GLUCOSE BLOOD QUANT: CPT | Performed by: NURSE PRACTITIONER

## 2022-04-15 RX ORDER — FLUTICASONE PROPIONATE 50 MCG
1 SPRAY, SUSPENSION (ML) NASAL DAILY
COMMUNITY
End: 2022-09-30

## 2022-04-15 ASSESSMENT — ENCOUNTER SYMPTOMS
PARESTHESIAS: 0
ABDOMINAL PAIN: 0
SHORTNESS OF BREATH: 0
JOINT SWELLING: 0
FEVER: 0
EYE PAIN: 0
CONSTIPATION: 0
DYSURIA: 0
NAUSEA: 0
DIZZINESS: 0
ARTHRALGIAS: 0
HEADACHES: 0
MYALGIAS: 0
NERVOUS/ANXIOUS: 0
WEAKNESS: 0
CHILLS: 0
HEMATOCHEZIA: 0
HEMATURIA: 0
PALPITATIONS: 0
FREQUENCY: 0
SORE THROAT: 1
COUGH: 0
DIARRHEA: 0
HEARTBURN: 0

## 2022-04-15 NOTE — PATIENT INSTRUCTIONS
Preventive Health Recommendations  Male Ages 40 to 49    Yearly exam:             See your health care provider every year in order to  o   Review health changes.   o   Discuss preventive care.    o   Review your medicines if your doctor has prescribed any.  You should be tested each year for STDs (sexually transmitted diseases) if you re at risk.   Have a cholesterol test every 5 years.   Have a colonoscopy (test for colon cancer) if someone in your family has had colon cancer or polyps before age 50.   After age 45, have a diabetes test (fasting glucose). If you are at risk for diabetes, you should have this test every 3 years.    Talk with your health care provider about whether or not a prostate cancer screening test (PSA) is right for you.    Shots: Get a flu shot each year. Get a tetanus shot every 10 years.     Nutrition:  Eat at least 5 servings of fruits and vegetables daily.   Eat whole-grain bread, whole-wheat pasta and brown rice instead of white grains and rice.   Get adequate Calcium and Vitamin D.     Lifestyle  Exercise for at least 150 minutes a week (30 minutes a day, 5 days a week). This will help you control your weight and prevent disease.   Limit alcohol to one drink per day.   No smoking.   Wear sunscreen to prevent skin cancer.   See your dentist every six months for an exam and cleaning.      -Try taking Zyrtec- can take twice daily  -Continue flonase nasal spray  -If things don't improve, consider referral to Allergy Clinic

## 2022-04-15 NOTE — PROGRESS NOTES
SUBJECTIVE:   CC: Ramses Spicer is an 46 year old male who presents for preventative health visit.       Patient has been advised of split billing requirements and indicates understanding: Yes     Last seen 12--Dr. Kwan    Healthy Habits:     Getting at least 3 servings of Calcium per day:  Yes    Bi-annual eye exam:  NO    Dental care twice a year:  Yes    Sleep apnea or symptoms of sleep apnea:  Excessive snoring and Sleep apnea    Diet:  Regular (no restrictions)    Frequency of exercise:  2-3 days/week    Duration of exercise:  30-45 minutes    Taking medications regularly:  Yes    Barriers to taking medications:  None    Medication side effects:  Not applicable    PHQ-2 Total Score: 0    Additional concerns today:  Yes    Health Maintenance Screening:    -Immunizations:   -Influenza: Up to date   -Pneumococcal: NA   -Td/Tdap: Up to date   -Shingles: NA   -HPV: NA   -MMR: NA   -COVID Up to date, fully vaccinated    -Colon Cancer Screening:  Due    -PSA:  No symptoms; defer screening until age 50- discussed with patient    -Lung Cancer Screening:  NA    -STD Screen:  Low risk, declines.  Accepts 1x Hep C screen    -Cholesterol Screening:  Due    -Diabetes Screening:  Due    -Depression Screening: Due    Today's PHQ-2 Score:   PHQ-2 ( 1999 Pfizer) 12/30/2019   Q1: Little interest or pleasure in doing things 0   Q2: Feeling down, depressed or hopeless 0   PHQ-2 Score 0   PHQ-2 Total Score (12-17 Years)- Positive if 3 or more points; Administer PHQ-A if positive 0   Q1: Little interest or pleasure in doing things -   Q2: Feeling down, depressed or hopeless -   PHQ-2 Score -     Other Concern:    Nasal congestion:  Has been having for several months.  Nasal congestion, drainage.  Taking Claritin daily and as well as Flonase daily.  States medications help short-term but the congestion and drainage come back.  He was having clear drainage.  He has a mild sore throat.  Endorses that his kids are in  and  they are also sick with cold symptoms frequently.  Denies fevers or chills.  No sinus pain.  Does have allergies to pets, has a dog.    Snores:  Has been seen by [facility].  States he did not qualify for a CPAP rather he is using an oral device which is helpful.    Abuse: Current or Past(Physical, Sexual or Emotional)- No  Do you feel safe in your environment? Yes    Have you ever done Advance Care Planning? (For example, a Health Directive, POLST, or a discussion with a medical provider or your loved ones about your wishes): No, advance care planning information given to patient to review.  Patient declined advance care planning discussion at this time.    Social History     Tobacco Use     Smoking status: Never Smoker     Smokeless tobacco: Former User     Quit date: 4/1/2016   Substance Use Topics     Alcohol use: Yes     Comment: 1 drink/week     If you drink alcohol do you typically have >3 drinks per day or >7 drinks per week? No    Alcohol Use 12/29/2019   Prescreen: >3 drinks/day or >7 drinks/week? No   No flowsheet data found.    Last PSA: No results found for: PSA    Reviewed orders with patient. Reviewed health maintenance and updated orders accordingly - Yes  Lab work is in process  Labs reviewed in EPIC    Reviewed and updated as needed this visit by clinical staff                    Reviewed and updated as needed this visit by Provider                   Past Medical History:   Diagnosis Date     Acid reflux disease      Morbidly obese (H)       Past Surgical History:   Procedure Laterality Date     EXCISE EXOSTOSIS FOOT Left 3/31/2015    Procedure: EXCISE EXOSTOSIS FOOT;  Surgeon: Camron Schwartz DPM;  Location: [facility]     GI SURGERY  infant    ?pylorotomy for pyloric stenosis     REPAIR TENDON ACHILLES Left 3/31/2015    Procedure: REPAIR TENDON ACHILLES;  Surgeon: Camron Schwartz DPM;  Location: [facility]       Review of Systems   Constitutional: Negative for chills and fever.   HENT: Positive  "for congestion and sore throat. Negative for ear pain and hearing loss.    Eyes: Negative for pain and visual disturbance.   Respiratory: Negative for cough and shortness of breath.    Cardiovascular: Negative for chest pain, palpitations and peripheral edema.   Gastrointestinal: Negative for abdominal pain, constipation, diarrhea, heartburn, hematochezia and nausea.   Genitourinary: Negative for dysuria, frequency, genital sores, hematuria, impotence, penile discharge and urgency.   Musculoskeletal: Negative for arthralgias, joint swelling and myalgias.   Skin: Negative for rash.   Neurological: Negative for dizziness, weakness, headaches and paresthesias.   Psychiatric/Behavioral: Negative for mood changes. The patient is not nervous/anxious.      CONSTITUTIONAL: NEGATIVE for fever, chills, change in weight  INTEGUMENTARY/SKIN: NEGATIVE for worrisome rashes, moles or lesions  EYES: NEGATIVE for vision changes or irritation  ENT: POSITIVE for nasal congestion and drainage, mild sore throat  RESP: NEGATIVE for significant cough or SOB  CV: NEGATIVE for chest pain, palpitations or peripheral edema  GI: NEGATIVE for nausea, abdominal pain, heartburn, or change in bowel habits   male: negative for dysuria, hematuria, decreased urinary stream, erectile dysfunction, urethral discharge  MUSCULOSKELETAL: NEGATIVE for significant arthralgias or myalgia  NEURO: NEGATIVE for weakness, dizziness or paresthesias  PSYCHIATRIC: NEGATIVE for changes in mood or affect    OBJECTIVE:   /80   Pulse 75   Temp 97.3  F (36.3  C) (Tympanic)   Ht 1.905 m (6' 3\")   Wt (!) 171.9 kg (379 lb)   SpO2 97%   BMI 47.37 kg/m      Physical Exam  GENERAL: healthy, alert and no distress  EYES: Eyes grossly normal to inspection, PERRL and conjunctivae and sclerae normal  HENT: ear canals and TM's normal, nose and mouth without ulcers or lesions  NECK: no adenopathy, no asymmetry, masses, or scars and thyroid normal to palpation  RESP: " lungs clear to auscultation - no rales, rhonchi or wheezes  CV: regular rate and rhythm, normal S1 S2, no S3 or S4, no murmur, click or rub, no peripheral edema and peripheral pulses strong  ABDOMEN: soft, nontender, no hepatosplenomegaly, no masses and bowel sounds normal  MS: no gross musculoskeletal defects noted, no edema  SKIN: no suspicious lesions or rashes  NEURO: Normal strength and tone, mentation intact and speech normal  PSYCH: mentation appears normal, affect normal/bright    Diagnostic Test Results:  Labs reviewed in Epic    ASSESSMENT/PLAN:   Ramses was seen today for physical.    Diagnoses and all orders for this visit:    Gastroesophageal reflux disease without esophagitis    Routine general medical examination at a health care facility  -     REVIEW OF HEALTH MAINTENANCE PROTOCOL ORDERS  -     Glucose; Future  -     Glucose  -     Labs ordered  -     Colonoscopy ordered  -     Immunizations up to date    Screen for colon cancer  -     Adult Gastro Ref - Procedure Only; Future    Need for hepatitis C screening test  -     Hepatitis C Screen Reflex to HCV RNA Quant and Genotype; Future  -     Hepatitis C Screen Reflex to HCV RNA Quant and Genotype    Screening for hyperlipidemia  -     Lipid panel reflex to direct LDL Fasting; Future  -     Lipid panel reflex to direct LDL Fasting    Morbidly obese (H)  - BMI 47.37, GERD    Nasal congestion  - Suspect viral and allergy component.  Exam unrevealing- mild posterior oropharyngeal erythema, but no exudates or swelling  - Advised to try Zyrtec, could take BID, continue flonase  - If symptoms persist, consider referral to Allergy Clinic.  Patient agrees with plan      Patient has been advised of split billing requirements and indicates understanding: Yes    COUNSELING:   Reviewed preventive health counseling, as reflected in patient instructions    Estimated body mass index is 47.5 kg/m  as calculated from the following:    Height as of 8/30/21: 1.905 m (6'  "3\").    Weight as of 8/30/21: 172.4 kg (380 lb).     Weight management plan: Discussed healthy diet and exercise guidelines    He reports that he has never smoked. He quit smokeless tobacco use about 6 years ago.      Counseling Resources:  ATP IV Guidelines  Pooled Cohorts Equation Calculator  FRAX Risk Assessment  ICSI Preventive Guidelines  Dietary Guidelines for Americans, 2010  USDA's MyPlate  ASA Prophylaxis  Lung CA Screening    ANDREW Madison CNP  [facility]  "

## 2022-09-30 ENCOUNTER — E-VISIT (OUTPATIENT)
Dept: URGENT CARE | Facility: CLINIC | Age: 47
End: 2022-09-30
Payer: COMMERCIAL

## 2022-09-30 DIAGNOSIS — Z20.822 SUSPECTED COVID-19 VIRUS INFECTION: Primary | ICD-10-CM

## 2022-09-30 PROCEDURE — 99207 PR NO BILLABLE SERVICE THIS VISIT: CPT | Performed by: PHYSICIAN ASSISTANT

## 2022-09-30 NOTE — PATIENT INSTRUCTIONS
Dear Ramses,      Based on your responses, you may have coronavirus (COVID-19).     Will I be tested for COVID-19?  We would like to test you for COVID. I have placed orders for this test.     To schedule: go to your Backbl[employer] home page and scroll down to the section that says  You have an appointment that needs to be scheduled  and click the large green button that says  Schedule Now  and follow the steps to find the next available openings.    If you are unable to complete these Backblaze scheduling steps, please call [phone] to schedule your testing.     These guidelines are for isolating before returning to work, school or .     For employers, schools and day cares: This is an official notice for this person s medical guidelines for returning in-person.     For health care sites: The CDC gives different isolation and quarantine guidelines for healthcare sites, please check with these sites before arriving.     How do I self-isolate?  You isolate when you have symptoms of COVID or a test shows you have COVID, even if you don t have symptoms.     If you DO have symptoms:  o Stay home and away from others  - For at least 5 days after your symptoms started, AND   - You are fever free for 24 hours (with no medicine that reduces fever), AND  - Your other symptoms are better.  o Wear a mask for 10 full days any time you are around others.    If you DON T have symptoms:  o Stay at home and away from others for at least 5 days after your positive test.  o Wear a mask for 10 full days any time you are around others.    How can I take care of myself?  Over the counter medications may help with your symptoms such as runny or stuffy nose, cough, chills, or fever.  Talk to your care team about your options.     Some people are at high risk of severe illness (for example, you have a weak immune system, you re 65 years or older, or you have certain medical problems). If your risk is high and your symptoms started in the  last 5 to 7 days, we strongly recommend for you to get COVID treatment as soon as possible. Paxlovid, Molnupiravir and the monoclonal antibody treatments are proven safe and effective, make you feel better faster, and prevent hospitalization and death.       To schedule an appointment to discuss COVID treatment, request an appointment on MyChart (select  COVID-19 Treatment ) or call [phone] ([phone]), press [phone].      Get lots of rest. Drink extra fluids (unless a doctor has told you not to)    Take Tylenol (acetaminophen) or ibuprofen for fever or pain. If you have liver or kidney problems, ask your family doctor if it's okay to take Tylenol or ibuprofen    Take over the counter medications for your symptoms, as directed by your doctor. You may also talk to your pharmacist.      If you have other health problems (like cancer, heart failure, an organ transplant or severe kidney disease): Call your specialty clinic if you don't feel better in the next 2 days.    Know when to call 911. Emergency warning signs include:  o Trouble breathing or shortness of breath  o Pain or pressure in the chest that doesn't go away  o Feeling confused like you haven't felt before, or not being able to wake up  o Bluish-colored lips or face    Where can I get more information?    [facility] - About COVID-19: [email or web address]     CDC - What to Do If You're Sick: https://www.cdc.gov/coronavirus/2019-ncov/if-you-are-sick/index.html     CDC - Quarantine & Isolation: https://www.cdc.gov/coronavirus/2019-ncov/your-health/quarantine-isolation.html     [facility] clinical trials (COVID-19 research studies): [email or web address]/umn-clinical-trials    Below are the COVID-19 hotlines at the [facility] ([facility]). Interpreters are available.  o For health questions: Call 892-630-4909 or 1-310.125.9095 (7 a.m. to 7 p.m.)  o For questions about schools and childcare: Call 577-884-9771 or  2-880-916-7150 (7 a.m. to 7 p.m.)  September 30, 2022  RE:  Ramses Spicer                                                                                                                  [id number] [facility] [address]      To whom it may concern:    I evaluated Ramses Spicer on September 30, 2022. Ramses Spicer should be excused from work/school.     They should let their workplace manager and staffing office know when their quarantine ends.    We can not give an exact date as it depends on the information below. They can calculate this on their own or work with their manager/staffing office to calculate this. (For example if they were exposed on 10/04, they would have to quarantine for 14 full days. That would be through 10/18. They could return on 10/19.)    Quarantine Guidelines:    If patient receives a positive COVID-19 test result, they should follow the guidance of those who are giving the results. Usually the return to work is 10 (or in some cases 20 days from symptom onset.) If they work at [employer], they must be cleared by Employee Occupational Health and Safety to return to work.      If patient receives a negative COVID-19 test result and did not have a high risk exposure to someone with a known positive COVID-19 test, they can return to work once they're free of fever for 24 hours without fever-reducing medication and their symptoms are improving or resolved.    If patient receives a negative COVID-19 test and if they had a high risk exposure to someone who has tested positive for COVID-19 then they can return to work 14 days after their last contact with the positive individual    Note: If there is ongoing exposure to the covid positive person, this quarantine period may be longer than 14 days. (For example, if they are continually exposed to their child, who tested positive and cannot isolate from them, then the quarantine of 7-14 days can't start until their child is no longer  contagious. This is typically 10 days from onset to the child's symptoms. So the total duration may be 17-24 days in this case.)     Sincerely,  Teresa Carter PA-C          o

## 2022-10-01 ENCOUNTER — VIRTUAL VISIT (OUTPATIENT)
Dept: URGENT CARE | Facility: CLINIC | Age: 47
End: 2022-10-01
Payer: COMMERCIAL

## 2022-10-01 DIAGNOSIS — U07.1 CLINICAL DIAGNOSIS OF COVID-19: Primary | ICD-10-CM

## 2022-10-01 PROCEDURE — 99213 OFFICE O/P EST LOW 20 MIN: CPT | Mod: CS

## 2022-10-01 NOTE — PROGRESS NOTES
"Ramses is a 46 year old who is being evaluated via a billable telephone visit.      COVID late Thursday night 9/29. Sx started 9/29.  HA, fatigue.  COVID vaccinated and boosted.  Wife pregnant- testing negative so far    What phone number would you like to be contacted at? cell  How would you like to obtain your AVS? MyChart    Assessment & Plan     Clinical diagnosis of COVID-19    - nirmatrelvir and ritonavir (PAXLOVID) therapy pack; Take 3 tablets by mouth 2 times daily for 5 days (Take 2 Nirmatrelvir tablets and 1 Ritonavir tablet twice daily for 5 days)[id number]}     COVID-19 positive patient.  Encounter for consideration of medication intervention. Patient does qualify for a prescription. Full discussion with patient including medication options, risks and benefits. Potential drug interactions reviewed with patient.     Treatment Planned Paxlovid RX sent to [address] and [facility]    Temporary change to home medications:  None     Estimated body mass index is 47.37 kg/m  as calculated from the following:    Height as of 4/15/22: 1.905 m (6' 3\").    Weight as of 4/15/22: 171.9 kg (379 lb).  GFR Estimate   Date Value Ref Range Status   01/11/2016 79 >60 mL/min/1.7m2 Final     Comment:     Non  GFR Calc     Lavinia Choi MD  Virtual Urgent Care  [facility] VIRTUAL URGENT CARE    Subjective   Ramses is a 46 year old, presenting for the following health issues:  No chief complaint on file.      HPI     COVID late Thursday night 9/29. Sx started 9/29.  HA, fatigue.  COVID vaccinated and boosted.  Wife pregnant- testing negative so far      Review of Systems   Constitutional, HEENT, cardiovascular, pulmonary, GI, , musculoskeletal, neuro, skin, endocrine and psych systems are negative, except as otherwise noted.      Objective           Vitals:  No vitals were obtained today due to virtual visit.    Physical Exam   healthy, alert and no distress  PSYCH: Alert and oriented times 3; coherent " speech, normal   rate and volume, able to articulate logical thoughts, able   to abstract reason, no tangential thoughts, no hallucinations   or delusions  His affect is normal and pleasant  RESP: No cough, no audible wheezing, able to talk in full sentences  Remainder of exam unable to be completed due to telephone visits      Phone call duration: 10 minutes

## 2022-11-21 ENCOUNTER — HEALTH MAINTENANCE LETTER (OUTPATIENT)
Age: 47
End: 2022-11-21

## 2023-03-16 ENCOUNTER — VIRTUAL VISIT (OUTPATIENT)
Dept: FAMILY MEDICINE | Facility: CLINIC | Age: 48
End: 2023-03-16
Payer: COMMERCIAL

## 2023-03-16 DIAGNOSIS — R09.81 CONGESTION OF PARANASAL SINUS: Primary | ICD-10-CM

## 2023-03-16 PROCEDURE — 99213 OFFICE O/P EST LOW 20 MIN: CPT | Mod: VID | Performed by: STUDENT IN AN ORGANIZED HEALTH CARE EDUCATION/TRAINING PROGRAM

## 2023-03-16 RX ORDER — FEXOFENADINE HCL AND PSEUDOEPHEDRINE HCI 60; 120 MG/1; MG/1
1 TABLET, EXTENDED RELEASE ORAL DAILY
Qty: 15 TABLET | Refills: 1 | Status: SHIPPED | OUTPATIENT
Start: 2023-03-16 | End: 2023-05-17

## 2023-03-16 RX ORDER — FLUTICASONE PROPIONATE 50 MCG
1 SPRAY, SUSPENSION (ML) NASAL DAILY
Qty: 16 G | Refills: 1 | Status: SHIPPED | OUTPATIENT
Start: 2023-03-16 | End: 2024-07-10

## 2023-03-16 NOTE — PROGRESS NOTES
Ramses is a 47 year old who is being evaluated via a billable video visit.      How would you like to obtain your AVS? MyChart  If the video visit is dropped, the invitation should be resent by: Text to cell phone: [phone] needs link to cell avail from 2-430PM   Will anyone else be joining your video visit? No        1. Congestion of paranasal sinus  > told patient about rebound congestion with chronic afrin use, he is aware to stop the afrin   - can trial fluticasone (FLONASE) 50 MCG/ACT nasal spray; Spray 1 spray into both nostrils daily  Dispense: 16 g; Refill: 1  - prescription sent for fexofenadine-pseudoePHEDrine (ALLEGRA-D)  MG 12 hr tablet; Take 1 tablet by mouth daily  Dispense: 15 tablet; Refill: 1  - discontinued Claritin as Allegra D is also an antihistamine     Follow up plan:   return to clinic in 2-4 weeks if symptoms don't improve, as he may require a referral to ENT     Subjective   Ramses is a 47 year old, presenting for the following health issues:  Nasal Congestion (For about 1 month)      HPI   Acute Illness  Acute illness concerns: sinus congestion  Onset/Duration: about a month  Symptoms:  Fever: No  Chills/Sweats: No  Headache (location?): No  Sinus Pressure: YES  Conjunctivitis:  No  Ear Pain: no  Rhinorrhea: YES  Congestion: YES- sinus  Sore Throat: YES in the morning, he thinks from drainage while sleeping  Cough: no  Wheeze: YES- from breathing through his mouth  Fatigue/Achiness: No  Sick/Strep Exposure: No  Therapies tried and outcome: tried Afrin nasal spray has been using for about a month, it helps for a few hours and then congestion comes back and he needs to take it again. He is concerned about using the Afrin long term. Was told to use it about a year ago at this time for the same symptoms.      Review of Systems   As above       Objective    Vitals - Patient Reported  Pain Score: No Pain (1)    Vitals:  No vitals were obtained today due to virtual visit.    Physical Exam    GENERAL: Healthy, alert and no distress  EYES: Eyes grossly normal to inspection.  No discharge or erythema, or obvious scleral/conjunctival abnormalities.  RESP: No audible wheeze, cough, or visible cyanosis.  No visible retractions or increased work of breathing.    SKIN: Visible skin clear. No significant rash, abnormal pigmentation or lesions.  NEURO: Cranial nerves grossly intact.  Mentation and speech appropriate for age.  PSYCH: Mentation appears normal, affect normal/bright, judgement and insight intact, normal speech and appearance well-groomed.          Video-Visit Details    Type of service:  Video Visit duration was 5 minutes     Originating Location (pt. Location): Work     Distant Location (provider location):  On-site  Platform used for Video Visit: CBRITE

## 2023-04-20 ENCOUNTER — OFFICE VISIT (OUTPATIENT)
Dept: INTERNAL MEDICINE | Facility: CLINIC | Age: 48
End: 2023-04-20
Payer: COMMERCIAL

## 2023-04-20 VITALS
OXYGEN SATURATION: 97 % | RESPIRATION RATE: 18 BRPM | WEIGHT: 315 LBS | BODY MASS INDEX: 39.17 KG/M2 | HEIGHT: 75 IN | DIASTOLIC BLOOD PRESSURE: 68 MMHG | SYSTOLIC BLOOD PRESSURE: 128 MMHG | HEART RATE: 70 BPM

## 2023-04-20 DIAGNOSIS — Z12.11 SPECIAL SCREENING FOR MALIGNANT NEOPLASMS, COLON: ICD-10-CM

## 2023-04-20 DIAGNOSIS — R10.32: Primary | ICD-10-CM

## 2023-04-20 PROCEDURE — 99213 OFFICE O/P EST LOW 20 MIN: CPT | Performed by: INTERNAL MEDICINE

## 2023-04-20 NOTE — PROGRESS NOTES
"  ASSESSMENT/PLAN                                                      (R10.32) Discomfort of groin, left  (primary encounter diagnosis)  Comment: differential includes groin muscle strain versus hernia; unremarkable exam.  Plan: recommend continued monitoring for now as symptoms are improving; if symptoms worsen, change, or do not continue to improve, recommend ultrasound for further evaluation (order provided today - patient to schedule).    (Z12.11) Special screening for malignant neoplasms, colon  Plan: screening colonoscopy ordered - patient to schedule.     Rakel Kwan MD   [address] [address]  T: [phone], F: [phone]    SUBJECTIVE                                                      Ramses Spicer is a very pleasant 47 year old male who presents with left groin pain:    Started on Tuesday after moving heavy furniture. Developed sharp pain in the pubic area with tenderness in his left testicle afterwards. Pain is worse when bending over, lifting his left leg, or standing for prolonged periods. No urinary frequency, urgency, dysuria, or hematuria. No melena, hematochezia, constipation, diarrhea, or obstipation. No nausea or vomiting. Patient reports that his symptoms are better today than they were yesterday.    OBJECTIVE                                                      /68   Pulse 70   Resp 18   Ht 1.905 m (6' 3\")   Wt (!) 170.1 kg (375 lb)   SpO2 97%   BMI 46.87 kg/m    Constitutional: well-appearing  Genitourinary: normal scrotum; no palpable bulge with valsalva during left inguinal canal exam    ---    (Note documentation was completed, in part, with [email or web address] voice-recognition software. Documentation was reviewed, but some grammatical, spelling, and word errors may remain.)    "

## 2023-05-17 ENCOUNTER — OFFICE VISIT (OUTPATIENT)
Dept: INTERNAL MEDICINE | Facility: CLINIC | Age: 48
End: 2023-05-17
Payer: COMMERCIAL

## 2023-05-17 ENCOUNTER — HOSPITAL ENCOUNTER (OUTPATIENT)
Dept: ULTRASOUND IMAGING | Facility: CLINIC | Age: 48
Discharge: HOME | End: 2023-05-17
Attending: INTERNAL MEDICINE | Admitting: INTERNAL MEDICINE
Payer: COMMERCIAL

## 2023-05-17 VITALS
WEIGHT: 315 LBS | RESPIRATION RATE: 20 BRPM | BODY MASS INDEX: 39.17 KG/M2 | HEART RATE: 70 BPM | OXYGEN SATURATION: 96 % | DIASTOLIC BLOOD PRESSURE: 78 MMHG | SYSTOLIC BLOOD PRESSURE: 126 MMHG | HEIGHT: 75 IN

## 2023-05-17 DIAGNOSIS — Z01.818 PREOPERATIVE EXAMINATION: Primary | ICD-10-CM

## 2023-05-17 DIAGNOSIS — Z76.89 REFERRAL OF PATIENT: ICD-10-CM

## 2023-05-17 DIAGNOSIS — Z12.11 SPECIAL SCREENING FOR MALIGNANT NEOPLASMS, COLON: ICD-10-CM

## 2023-05-17 DIAGNOSIS — R10.32: ICD-10-CM

## 2023-05-17 PROBLEM — G47.33 OSA (OBSTRUCTIVE SLEEP APNEA): Status: ACTIVE | Noted: 2023-05-17

## 2023-05-17 PROCEDURE — 76705 ECHO EXAM OF ABDOMEN: CPT

## 2023-05-17 PROCEDURE — 99214 OFFICE O/P EST MOD 30 MIN: CPT | Performed by: INTERNAL MEDICINE

## 2023-05-17 NOTE — PROGRESS NOTES
ASSESSMENT/PLAN:                                                      Ramses Spicer is a pleasant 47 year old male who presents for a preoperative evaluation. He is undergoing a low risk procedure. His risk of major cardiac event is 0 points: 0.4%.    (Z01.818) Preoperative examination  (primary encounter diagnosis)  (Z12.11) Special screening for malignant neoplasms, colon  Plan: no additional work-up, cardiac or otherwise, indicated prior to colonoscopy; no medication changes/adjustments indicated prior to colonoscopy.    (Z76.89) Referral of patient  Plan: referred to urology for vasectomy consultation - patient will be contacted to schedule.      RECOMMEND PROCEEDING WITH SURGERY: YES    Rakel Kwan MD   [address] [address]  T: [phone], F: [phone]    SUBJECTIVE:                                                      Ramses Spicer is a very pleasant 47 year old male who presents for preoperative evaluation.    Surgeon: Good  Date: 6/16/2023  Location: [facility] OR  Surgery: colonoscopy (low risk)  Indication: screening for colon cancer    Past Medical History:   Diagnosis Date     Acid reflux disease      Morbidly obese (H)      MEGGAN (obstructive sleep apnea)     uses oral appliance     Personal or family history of excessive or prolonged bleeding? No  Personal or family history of blood clots? No  History of snoring/Sleep Apnea? YES - has MEGGAN - uses oral appliance  History of blood transfusions? No    Cardiovascular risk: None (0 points)    Risk of major cardiac event: 0 points: 0.4%    Past Surgical History:   Procedure Laterality Date     EXCISE EXOSTOSIS FOOT Left 3/31/2015    Procedure: EXCISE EXOSTOSIS FOOT;  Surgeon: Camron Schwartz DPM;  Location: [facility]     GI SURGERY  infant    ?pylorotomy for pyloric stenosis     REPAIR TENDON ACHILLES Left 3/31/2015    Procedure: REPAIR TENDON ACHILLES;  Surgeon: Camron Schwartz DPM;  Location:  [facility]     Artificial assistive devices, such as pacemakers, artificial cardiac valves, or artificial joints? No    No Active Allergies    Personal or family history of allergy to anesthesia? No  Allergy to local or topical analgesics? No  Allergy to latex? No  Allergy to adhesives/skin preparations (chlorhexadine)? No    Current Outpatient Medications   Medication Sig     fluticasone (FLONASE) 50 MCG/ACT nasal spray Spray 1 spray into both nostrils daily     PRILOSEC OTC PO 1 tablet daily     Over the counter medications? Other than above, no  Vitamin Supplements? No  Herbal Supplements? No    Family History   Problem Relation Age of Onset     Cerebrovascular Disease Father         early 60s     Pacemaker Father      Diabetes Type 2  Father         resolved with weight loss     Pancreatic Cancer Maternal Grandmother      Myocardial Infarction Maternal Grandfather         in 60s     Myocardial Infarction Paternal Grandmother         later in life     Coronary Artery Disease Early Onset No family hx of      Colon Cancer No family hx of      Prostate Cancer No family hx of      Social History     Occupational History     Occupation:      Comment: [employer]   Tobacco Use     Smoking status: Never     Smokeless tobacco: Former     Quit date: 4/1/2016   Vaping Use     Vaping status: Never Used   Substance and Sexual Activity     Alcohol use: Yes     Comment: 0-1 drink/week     Drug use: No     Sexual activity: Yes     Partners: Female   Social History Narrative    .     Functional capacity: Can do heavy work around the house like scrubbing floors or moving heavy furniture (7 METs)    OBJECTIVE:                                                      /78   Pulse 70   Resp 20   Ht 1.905 m (6' 3\")   Wt (!) 177.4 kg (391 lb 1.6 oz)   SpO2 96%   BMI 48.88 kg/m    Constitutional: well-appearing  Respiratory: normal respiratory effort; clear to auscultation bilaterally  Cardiovascular: regular rate " and rhythm; no edema  Gastrointestinal: soft, non-tender, non-distended, and bowel sounds present; no organomegaly or masses  Musculoskeletal: normal gait and station  Psych: normal judgment and insight; normal mood and affect    ---    (Chart documentation was completed, in part, with [employer] voice-recognition software. Even though reviewed, some grammatical, spelling, and word errors may remain.)

## 2023-06-15 ENCOUNTER — VIRTUAL VISIT (OUTPATIENT)
Dept: UROLOGY | Facility: CLINIC | Age: 48
End: 2023-06-15
Attending: INTERNAL MEDICINE
Payer: COMMERCIAL

## 2023-06-15 ENCOUNTER — ANESTHESIA EVENT (OUTPATIENT)
Dept: SURGERY | Facility: CLINIC | Age: 48
End: 2023-06-15
Payer: COMMERCIAL

## 2023-06-15 DIAGNOSIS — Z30.09 VASECTOMY EVALUATION: Primary | ICD-10-CM

## 2023-06-15 DIAGNOSIS — Z76.89 REFERRAL OF PATIENT: ICD-10-CM

## 2023-06-15 PROCEDURE — 99202 OFFICE O/P NEW SF 15 MIN: CPT | Mod: VID | Performed by: UROLOGY

## 2023-06-15 NOTE — NURSING NOTE
Is the patient currently in the state of [place]? YES    Visit mode:VIDEO    If the visit is dropped, the patient can be reconnected by: VIDEO VISIT: Text to cell phone: [phone]    Will anyone else be joining the visit? NO      How would you like to obtain your AVS? MyChart    Are changes needed to the allergy or medication list? NO    Reason for visit: Consult (Vasectomy)    Payton Lao on 6/15/2023 at 8:11 AM

## 2023-06-15 NOTE — PROGRESS NOTES
VASECTOMY CONSULTATION NOTE  DATE OF VISIT: 6/15/2023  [facility]   PATIENT NAME: Ramses Spicer    YOB: 1975      REASON FOR CONSULTATION: Mr. Ramses Spicer is a 47 year old year old gentleman who is seen today requesting a vasectomy. He has 3 children - 5 year old and 3 year old and 7 week old - and he wishes to have a vasectomy for birth control. His wife is in agreement with this plan.     PAST MEDICAL HISTORY:   Past Medical History:   Diagnosis Date     Acid reflux disease      Morbidly obese (H)      MEGGAN (obstructive sleep apnea)     uses oral appliance       PAST SURGICAL HISTORY:   Past Surgical History:   Procedure Laterality Date     EXCISE EXOSTOSIS FOOT Left 3/31/2015    Procedure: EXCISE EXOSTOSIS FOOT;  Surgeon: Camron Schwartz DPM;  Location: [facility]     GI SURGERY  infant    ?pylorotomy for pyloric stenosis     REPAIR TENDON ACHILLES Left 3/31/2015    Procedure: REPAIR TENDON ACHILLES;  Surgeon: Camron Schwartz DPM;  Location: [facility]       MEDICATIONS:   Current Outpatient Medications:      fluticasone (FLONASE) 50 MCG/ACT nasal spray, Spray 1 spray into both nostrils daily, Disp: 16 g, Rfl: 1     PRILOSEC OTC PO, 1 tablet daily, Disp: , Rfl:     ALLERGIES: No Known Allergies    FAMILY HISTORY:   Family History   Problem Relation Age of Onset     Cerebrovascular Disease Father         early 60s     Pacemaker Father      Diabetes Type 2  Father         resolved with weight loss     Pancreatic Cancer Maternal Grandmother      Myocardial Infarction Maternal Grandfather         in 60s     Myocardial Infarction Paternal Grandmother         later in life     Coronary Artery Disease Early Onset No family hx of      Colon Cancer No family hx of      Prostate Cancer No family hx of        SOCIAL HISTORY:   Social History     Socioeconomic History     Marital status:      Spouse name: Not on file     Number of children: 0     Years of education: 16     Highest education level:  Not on file   Occupational History     Occupation:      Comment: [employer]   Tobacco Use     Smoking status: Never     Smokeless tobacco: Former     Quit date: 4/1/2016   Vaping Use     Vaping status: Never Used   Substance and Sexual Activity     Alcohol use: Yes     Comment: 0-1 drink/week     Drug use: No     Sexual activity: Yes     Partners: Female   Other Topics Concern     Parent/sibling w/ CABG, MI or angioplasty before 65F 55M? Not Asked   Social History Narrative    .    Three kids (5, 3, and 4 weeks as of May, 2023).    No formal exercise.      Social Determinants of Health     Financial Resource Strain: Not on file   Food Insecurity: Not on file   Transportation Needs: Not on file   Physical Activity: Not on file   Stress: Not on file   Social Connections: Not on file   Intimate Partner Violence: Not on file   Housing Stability: Not on file       PHYSICAL EXAM  Patient is a 47 year old  male   Vitals: There were no vitals taken for this visit.  There is no height or weight on file to calculate BMI.  General Appearance Adult:   Alert, no acute distress, oriented  HENT: throat/mouth:normal, good dentition  Lungs: no respiratory distress, or pursed lip breathing  Heart: No obvious jugular venous distension present  Abdomen: obesely - distended  Musculoskeltal: extremities normal, no peripheral edema  Skin: no suspicious lesions or rashes  Neuro: Alert, oriented, speech and mentation normal  Psych: affect and mood normal    DIAGNOSIS: Request for sterilization    PLAN: The risks of the procedure as well as expectations for recovery and outcomes were explained in detail to him.  He was counseled on the risks for bleeding infection and pain after the procedure. We discussed the risk of post-vasectomy pain syndrome.  He was instructed to continue to use contraception until he had proven azoospermia on a semen specimen.  This would normally be collected at least 3 months after the  procedure. Also discussed the rare, but possible risk of re-canalization of the vas, even after successful vasectomy with sterile semen specimen.  He was instructed to hold all anticoagulants medications for one week prior to the procedure.  It was recommended that he have someone else drive him home after his vasectomy.  In light of these risks and expectations he would like to proceed.  We are scheduling a vasectomy in the office in the near future.    Pt. Understands:  -1/1000-1/3000 risk of future pregnancy even with perfectly done vasectomy  -vasectomy is a permanent procedure    -he may cryopreserve sperm if he wishes   -1-5% risk of post-vasectomy pain syndrome   -1-5% risk of complication, primarily infection or bleeding  - he needs to have a semen sample that shows no sperm before getting approval for unprotected intercourse.      Thank you for the kind consultation.    Time spent: 15 minutes spent on the date of the encounter doing chart review, history and exam, documentation and further activities as noted above.     Chaka Bray MD   Urology  [facility]  Clinic Phone [phone]    Virtual Visit Details    Type of service:  Video Visit     Start time: 8:25 AM     End time: 8:32 AM     Originating Location (pt. Location): Home    Distant Location (provider location):  On-site  Platform used for Video Visit: Shayy

## 2023-06-16 ENCOUNTER — HOSPITAL ENCOUNTER (OUTPATIENT)
Facility: CLINIC | Age: 48
Discharge: HOME | End: 2023-06-16
Attending: INTERNAL MEDICINE | Admitting: INTERNAL MEDICINE
Payer: COMMERCIAL

## 2023-06-16 ENCOUNTER — ANESTHESIA (OUTPATIENT)
Dept: SURGERY | Facility: CLINIC | Age: 48
End: 2023-06-16
Payer: COMMERCIAL

## 2023-06-16 VITALS
OXYGEN SATURATION: 95 % | DIASTOLIC BLOOD PRESSURE: 62 MMHG | RESPIRATION RATE: 16 BRPM | SYSTOLIC BLOOD PRESSURE: 107 MMHG | HEART RATE: 60 BPM | TEMPERATURE: 98.3 F

## 2023-06-16 LAB — COLONOSCOPY: NORMAL

## 2023-06-16 PROCEDURE — 250N000011 HC RX IP 250 OP 636: Performed by: NURSE ANESTHETIST, CERTIFIED REGISTERED

## 2023-06-16 PROCEDURE — 360N000075 HC SURGERY LEVEL 2, PER MIN: Performed by: INTERNAL MEDICINE

## 2023-06-16 PROCEDURE — 272N000001 HC OR GENERAL SUPPLY STERILE: Performed by: INTERNAL MEDICINE

## 2023-06-16 PROCEDURE — 999N000141 HC STATISTIC PRE-PROCEDURE NURSING ASSESSMENT: Performed by: INTERNAL MEDICINE

## 2023-06-16 PROCEDURE — 370N000017 HC ANESTHESIA TECHNICAL FEE, PER MIN: Performed by: INTERNAL MEDICINE

## 2023-06-16 PROCEDURE — 258N000003 HC RX IP 258 OP 636: Performed by: ANESTHESIOLOGY

## 2023-06-16 PROCEDURE — 710N000012 HC RECOVERY PHASE 2, PER MINUTE: Performed by: INTERNAL MEDICINE

## 2023-06-16 RX ORDER — PROPOFOL 10 MG/ML
INJECTION, EMULSION INTRAVENOUS CONTINUOUS PRN
Status: DISCONTINUED | OUTPATIENT
Start: 2023-06-16 | End: 2023-06-16

## 2023-06-16 RX ORDER — NALOXONE HYDROCHLORIDE 0.4 MG/ML
0.4 INJECTION, SOLUTION INTRAMUSCULAR; INTRAVENOUS; SUBCUTANEOUS
Status: DISCONTINUED | OUTPATIENT
Start: 2023-06-16 | End: 2023-06-16 | Stop reason: HOSPADM

## 2023-06-16 RX ORDER — NALOXONE HYDROCHLORIDE 0.4 MG/ML
0.2 INJECTION, SOLUTION INTRAMUSCULAR; INTRAVENOUS; SUBCUTANEOUS
Status: DISCONTINUED | OUTPATIENT
Start: 2023-06-16 | End: 2023-06-16 | Stop reason: HOSPADM

## 2023-06-16 RX ORDER — ATROPINE SULFATE 0.1 MG/ML
1 INJECTION INTRAVENOUS
Status: DISCONTINUED | OUTPATIENT
Start: 2023-06-16 | End: 2023-06-16 | Stop reason: HOSPADM

## 2023-06-16 RX ORDER — ONDANSETRON 2 MG/ML
4 INJECTION INTRAMUSCULAR; INTRAVENOUS EVERY 30 MIN PRN
Status: CANCELLED | OUTPATIENT
Start: 2023-06-16

## 2023-06-16 RX ORDER — FLUMAZENIL 0.1 MG/ML
0.2 INJECTION, SOLUTION INTRAVENOUS
Status: CANCELLED | OUTPATIENT
Start: 2023-06-16 | End: 2023-06-16

## 2023-06-16 RX ORDER — SIMETHICONE 40MG/0.6ML
133 SUSPENSION, DROPS(FINAL DOSAGE FORM)(ML) ORAL
Status: DISCONTINUED | OUTPATIENT
Start: 2023-06-16 | End: 2023-06-16 | Stop reason: HOSPADM

## 2023-06-16 RX ORDER — DIPHENHYDRAMINE HYDROCHLORIDE 50 MG/ML
25-50 INJECTION INTRAMUSCULAR; INTRAVENOUS
Status: DISCONTINUED | OUTPATIENT
Start: 2023-06-16 | End: 2023-06-16 | Stop reason: HOSPADM

## 2023-06-16 RX ORDER — EPINEPHRINE 1 MG/ML
0.1 INJECTION, SOLUTION INTRAMUSCULAR; SUBCUTANEOUS
Status: DISCONTINUED | OUTPATIENT
Start: 2023-06-16 | End: 2023-06-16 | Stop reason: HOSPADM

## 2023-06-16 RX ORDER — ONDANSETRON 2 MG/ML
4 INJECTION INTRAMUSCULAR; INTRAVENOUS EVERY 6 HOURS PRN
Status: CANCELLED | OUTPATIENT
Start: 2023-06-16

## 2023-06-16 RX ORDER — PROPOFOL 10 MG/ML
INJECTION, EMULSION INTRAVENOUS PRN
Status: DISCONTINUED | OUTPATIENT
Start: 2023-06-16 | End: 2023-06-16

## 2023-06-16 RX ORDER — ONDANSETRON 2 MG/ML
INJECTION INTRAMUSCULAR; INTRAVENOUS PRN
Status: DISCONTINUED | OUTPATIENT
Start: 2023-06-16 | End: 2023-06-16

## 2023-06-16 RX ORDER — ONDANSETRON 2 MG/ML
4 INJECTION INTRAMUSCULAR; INTRAVENOUS
Status: DISCONTINUED | OUTPATIENT
Start: 2023-06-16 | End: 2023-06-16 | Stop reason: HOSPADM

## 2023-06-16 RX ORDER — PROCHLORPERAZINE MALEATE 10 MG
10 TABLET ORAL EVERY 6 HOURS PRN
Status: CANCELLED | OUTPATIENT
Start: 2023-06-16

## 2023-06-16 RX ORDER — LIDOCAINE 40 MG/G
CREAM TOPICAL
Status: DISCONTINUED | OUTPATIENT
Start: 2023-06-16 | End: 2023-06-16 | Stop reason: HOSPADM

## 2023-06-16 RX ORDER — OXYCODONE HYDROCHLORIDE 5 MG/1
10 TABLET ORAL
Status: CANCELLED | OUTPATIENT
Start: 2023-06-16

## 2023-06-16 RX ORDER — OXYCODONE HYDROCHLORIDE 5 MG/1
5 TABLET ORAL
Status: CANCELLED | OUTPATIENT
Start: 2023-06-16

## 2023-06-16 RX ORDER — FLUMAZENIL 0.1 MG/ML
0.2 INJECTION, SOLUTION INTRAVENOUS
Status: DISCONTINUED | OUTPATIENT
Start: 2023-06-16 | End: 2023-06-16 | Stop reason: HOSPADM

## 2023-06-16 RX ORDER — ONDANSETRON 4 MG/1
4 TABLET, ORALLY DISINTEGRATING ORAL EVERY 30 MIN PRN
Status: CANCELLED | OUTPATIENT
Start: 2023-06-16

## 2023-06-16 RX ORDER — FENTANYL CITRATE 50 UG/ML
50-100 INJECTION, SOLUTION INTRAMUSCULAR; INTRAVENOUS EVERY 5 MIN PRN
Status: DISCONTINUED | OUTPATIENT
Start: 2023-06-16 | End: 2023-06-16 | Stop reason: HOSPADM

## 2023-06-16 RX ORDER — ONDANSETRON 4 MG/1
4 TABLET, ORALLY DISINTEGRATING ORAL EVERY 6 HOURS PRN
Status: CANCELLED | OUTPATIENT
Start: 2023-06-16

## 2023-06-16 RX ORDER — SODIUM CHLORIDE, SODIUM LACTATE, POTASSIUM CHLORIDE, CALCIUM CHLORIDE 600; 310; 30; 20 MG/100ML; MG/100ML; MG/100ML; MG/100ML
INJECTION, SOLUTION INTRAVENOUS CONTINUOUS
Status: DISCONTINUED | OUTPATIENT
Start: 2023-06-16 | End: 2023-06-16 | Stop reason: HOSPADM

## 2023-06-16 RX ADMIN — PROPOFOL 50 MG: 10 INJECTION, EMULSION INTRAVENOUS at 07:26

## 2023-06-16 RX ADMIN — PROPOFOL 50 MG: 10 INJECTION, EMULSION INTRAVENOUS at 07:25

## 2023-06-16 RX ADMIN — PROPOFOL 150 MCG/KG/MIN: 10 INJECTION, EMULSION INTRAVENOUS at 07:25

## 2023-06-16 RX ADMIN — SODIUM CHLORIDE, POTASSIUM CHLORIDE, SODIUM LACTATE AND CALCIUM CHLORIDE: 600; 310; 30; 20 INJECTION, SOLUTION INTRAVENOUS at 06:55

## 2023-06-16 RX ADMIN — ONDANSETRON 4 MG: 2 INJECTION INTRAMUSCULAR; INTRAVENOUS at 07:28

## 2023-06-16 ASSESSMENT — ACTIVITIES OF DAILY LIVING (ADL): ADLS_ACUITY_SCORE: 35

## 2023-06-16 NOTE — ANESTHESIA PREPROCEDURE EVALUATION
Anesthesia Pre-Procedure Evaluation    Patient: Ramses Spicer   MRN: [id number] : 1975        Procedure : Procedure(s):  COLONOSCOPY          Past Medical History:   Diagnosis Date     Acid reflux disease      Morbidly obese (H)      MEGGAN (obstructive sleep apnea)     uses oral appliance      Past Surgical History:   Procedure Laterality Date     EXCISE EXOSTOSIS FOOT Left 3/31/2015    Procedure: EXCISE EXOSTOSIS FOOT;  Surgeon: Camron Schwartz DPM;  Location: [facility]     GI SURGERY  infant    ?pylorotomy for pyloric stenosis     REPAIR TENDON ACHILLES Left 3/31/2015    Procedure: REPAIR TENDON ACHILLES;  Surgeon: Camron Schwartz DPM;  Location: [facility]      No Known Allergies   Social History     Tobacco Use     Smoking status: Never     Smokeless tobacco: Former     Quit date: 2016   Vaping Use     Vaping status: Never Used   Substance Use Topics     Alcohol use: Yes     Comment: 0-1 drink/week      Wt Readings from Last 1 Encounters:   23 (!) 177.4 kg (391 lb 1.6 oz)        Anesthesia Evaluation   Pt has had prior anesthetic.     No history of anesthetic complications       ROS/MED HX  ENT/Pulmonary:     (+) sleep apnea, doesn't use CPAP,     Neurologic:  - neg neurologic ROS     Cardiovascular:  - neg cardiovascular ROS     METS/Exercise Tolerance: >4 METS    Hematologic:  - neg hematologic  ROS     Musculoskeletal:  - neg musculoskeletal ROS     GI/Hepatic:  - neg GI/hepatic ROS   (+) GERD, Asymptomatic on medication,     Renal/Genitourinary:  - neg Renal ROS     Endo:     (+) Obesity,     Psychiatric/Substance Use:  - neg psychiatric ROS     Infectious Disease:  - neg infectious disease ROS     Malignancy:  - neg malignancy ROS     Other:  - neg other ROS          Physical Exam    Airway  airway exam normal      Mallampati: II    Neck ROM: full   Mouth opening: > 3 cm    Respiratory Devices and Support         Dental       (+) Minor Abnormalities - some fillings, tiny  chips      Cardiovascular   cardiovascular exam normal       Rhythm and rate: regular and normal     Pulmonary   pulmonary exam normal        breath sounds clear to auscultation           OUTSIDE LABS:  CBC:   Lab Results   Component Value Date    WBC 7.6 03/30/2004    HGB 15.4 09/13/2011    HGB 15.7 03/30/2004    HCT 48.8 03/30/2004     04/06/2015     03/30/2004     BMP:   Lab Results   Component Value Date     01/11/2016     11/06/2015    POTASSIUM 4.4 01/11/2016    POTASSIUM 4.2 11/06/2015    CHLORIDE 107 01/11/2016    CHLORIDE 104 11/06/2015    CO2 32 01/11/2016    CO2 26 11/06/2015    BUN 17 01/11/2016    BUN 21 11/06/2015    CR 1.05 01/11/2016    CR 1.05 11/06/2015    GLC 96 04/15/2022    GLC 88 01/11/2016     COAGS: No results found for: PTT, INR, FIBR  POC: No results found for: BGM, HCG, HCGS  HEPATIC:   Lab Results   Component Value Date    ALBUMIN 3.7 01/11/2016    PROTTOTAL 6.7 (L) 01/11/2016    ALT 25 01/11/2016    AST 10 01/11/2016    ALKPHOS 96 01/11/2016    BILITOTAL 0.3 01/11/2016     OTHER:   Lab Results   Component Value Date    PH  10/04/2011     Test canceled - Lab  error  ORDERED UNDER WRONG ACCOUNT  CORRECTED ON 10/05 AT 1651: PREVIOUSLY REPORTED AS 7.42    PH 7.42 10/04/2011    A1C 5.9 11/06/2015    BLAKE 8.2 (L) 01/11/2016    TSH 1.51 09/13/2011    CRP 0.78 03/30/2004       Anesthesia Plan    ASA Status:  3      Anesthesia Type: MAC.   Induction: Propofol.           Consents    Anesthesia Plan(s) and associated risks, benefits, and realistic alternatives discussed. Questions answered and patient/representative(s) expressed understanding.    - Discussed:     - Discussed with:  Patient, Spouse      - Extended Intubation/Ventilatory Support Discussed: No.      - Patient is DNR/DNI Status: No    Use of blood products discussed: No .     Postoperative Care    Pain management: Multi-modal analgesia.   PONV prophylaxis: Ondansetron (or other 5HT-3), Dexamethasone  or Solumedrol     Comments:                Arsenio Restrepo MD

## 2023-06-16 NOTE — PRE-PROCEDURE
GENERAL PRE-PROCEDURE:   Procedure:  Colonoscopy   Date/Time:  6/16/2023 7:15 AM    Verbal consent obtained?: Yes    Written consent obtained?: Yes    Risks and benefits: Risks, benefits and alternatives were discussed    Consent given by:  Patient  Patient states understanding of procedure being performed: Yes    Patient's understanding of procedure matches consent: Yes    Procedure consent matches procedure scheduled: Yes    Expected level of sedation:  Moderate  Appropriately NPO:  Yes  ASA Class:  2  Mallampati  :  Grade 2- soft palate, base of uvula, tonsillar pillars, and portion of posterior pharyngeal wall visible  Lungs:  Lungs clear with good breath sounds bilaterally  Heart:  Normal heart sounds and rate  History & Physical reviewed:  History and physical reviewed and no updates needed  Statement of review:  I have reviewed the lab findings, diagnostic data, medications, and the plan for sedation

## 2023-06-16 NOTE — ANESTHESIA POSTPROCEDURE EVALUATION
Patient: Ramses Spicer    Procedure: Procedure(s):  COLONOSCOPY       Anesthesia Type:  MAC    Note:  Disposition: Outpatient   Postop Pain Control: Uneventful            Sign Out: Well controlled pain   PONV: No   Neuro/Psych: Uneventful            Sign Out: Acceptable/Baseline neuro status   Airway/Respiratory: Uneventful            Sign Out: Acceptable/Baseline resp. status   CV/Hemodynamics: Uneventful            Sign Out: Acceptable CV status; No obvious hypovolemia; No obvious fluid overload   Other NRE: NONE   DID A NON-ROUTINE EVENT OCCUR? No           Last vitals:  Vitals Value Taken Time   /62 06/16/23 0810   Temp 36.7  C (98.1  F) 06/16/23 0742   Pulse 60 06/16/23 0811   Resp 16 06/16/23 0750   SpO2 94 % 06/16/23 0811   Vitals shown include unvalidated device data.    Electronically Signed By: Arsenio Restrepo MD  June 16, 2023  8:22 AM

## 2023-06-16 NOTE — ANESTHESIA CARE TRANSFER NOTE
Patient: Ramses Spicer    Procedure: Procedure(s):  COLONOSCOPY       Diagnosis: Screening for colon cancer [Z12.11]  Diagnosis Additional Information: No value filed.    Anesthesia Type:   MAC     Note:    Oropharynx: oropharynx clear of all foreign objects  Level of Consciousness: awake  Oxygen Supplementation: face mask  Level of Supplemental Oxygen (L/min / FiO2): 6  Independent Airway: airway patency satisfactory and stable  Dentition: dentition unchanged  Vital Signs Stable: post-procedure vital signs reviewed and stable  Report to RN Given: handoff report given  Patient transferred to: Phase II    Handoff Report: Identifed the Patient, Identified the Reponsible Provider, Reviewed the pertinent medical history, Discussed the surgical course, Reviewed Intra-OP anesthesia mangement and issues during anesthesia, Set expectations for post-procedure period and Allowed opportunity for questions and acknowledgement of understanding      Vitals:  Vitals Value Taken Time   /50 06/16/23 0744   Temp 36.7  C (98.1  F) 06/16/23 0742   Pulse 57 06/16/23 0745   Resp 16 06/16/23 0742   SpO2 99 % 06/16/23 0745   Vitals shown include unvalidated device data.    Electronically Signed By: ANDREW Quiles CRNA  June 16, 2023  7:47 AM

## 2023-06-16 NOTE — H&P
CONSULTING PHYSICIAN   Rich Funk MD     REASON FOR CONSULTATION   Screening colonoscopy      CHIEF COMPLAINT   Ramses Spicer came to the hospital for outpatient screening colonoscopy      HISTORY OF PRESENT ILLNESS   Ramses Spicer is a 47 year old male with GERD, MEGGAN, seasonal allergies here for a screening colonoscopy.   No family history of colon cancer or colon polyps      PAST HISTORY   Past Medical History:   Diagnosis Date     Acid reflux disease      Morbidly obese (H)      MEGGAN (obstructive sleep apnea)     uses oral appliance      Past Surgical History:   Procedure Laterality Date     EXCISE EXOSTOSIS FOOT Left 3/31/2015    Procedure: EXCISE EXOSTOSIS FOOT;  Surgeon: Camron Schwartz DPM;  Location: [facility]     GI SURGERY  infant    ?pylorotomy for pyloric stenosis     REPAIR TENDON ACHILLES Left 3/31/2015    Procedure: REPAIR TENDON ACHILLES;  Surgeon: Camron Schwartz DPM;  Location: [facility]        Family History Social History   Family History   Problem Relation Age of Onset     Cerebrovascular Disease Father         early 60s     Pacemaker Father      Diabetes Type 2  Father         resolved with weight loss     Pancreatic Cancer Maternal Grandmother      Myocardial Infarction Maternal Grandfather         in 60s     Myocardial Infarction Paternal Grandmother         later in life     Coronary Artery Disease Early Onset No family hx of      Colon Cancer No family hx of      Prostate Cancer No family hx of     Social History     Tobacco Use     Smoking status: Never     Smokeless tobacco: Former     Quit date: 4/1/2016   Vaping Use     Vaping status: Never Used   Substance Use Topics     Alcohol use: Yes     Comment: 0-1 drink/week     Drug use: No          MEDICATIONS & ALLERGIES   Medications Prior to Admission   Medication Sig Dispense Refill Last Dose     fluticasone (FLONASE) 50 MCG/ACT nasal spray Spray 1 spray into both nostrils daily 16 g 1 Past  Week     PRILOSEC OTC PO 1 tablet daily   6/16/2023        ALLERGIES   No Known Allergies      REVIEW OF SYSTEMS   A comprehensive review of systems was performed and was otherwise noncontributory.     OBJECTIVE   Vitals Blood pressure 120/66, pulse 60, temperature 96.9  F (36.1  C), temperature source Temporal, resp. rate 16, SpO2 97 %.           Physical  Exam   GENERAL: alert and oriented, well nourished in no apparent distress    SKIN: warm and dry, no rashes    HEENT: atraumatic, anicteric, moist mucous membranes, neck soft/supple     PULMONARY: normal resp effort, breath sounds clear to auscultation bilateral    CARDIOVASCULAR: normal rate and rhythm, no murmurs, no edema    ABDOMEN: no tenderness, no distention, bowel sounds normal    MUSCULOSKELETAL: joints and gait normal    NEUROLOGICAL: appropriate mental status, grossly intact  DERM: no rash, no jaundice    PSYCHIATRIC: normal mood, affect and insight        LABORATORY    ELECTROLYTE PANEL   No results for input(s): NA, POTASSIUM, CHLORIDE, CO2, GLC, CR, BUN in the last 168 hours.    Invalid input(s): CL   HEMATOLOGY PANEL   No results for input(s): HGB, MCV, WBC, PLT, INR in the last 168 hours.   LIVER AND PANCREAS PANEL   No results for input(s): AST, ALT, ALKPHOS, BILITOTAL, LIPASE in the last 168 hours.  IMAGING STUDIES        I have reviewed the current diagnostic and laboratory tests.           IMPRESSION   Ramses Spicer is a 47 year old male with GERD, MEGGAN here for a screening colonoscopy                 Rich Funk MD  Thank you for the opportunity to participate in the care of this patient.   Please feel free to call me with any questions or concerns.  Phone number [phone].

## 2023-06-17 ENCOUNTER — OFFICE VISIT (OUTPATIENT)
Dept: URGENT CARE | Facility: URGENT CARE | Age: 48
End: 2023-06-17
Payer: COMMERCIAL

## 2023-06-17 VITALS
OXYGEN SATURATION: 96 % | BODY MASS INDEX: 49.5 KG/M2 | WEIGHT: 315 LBS | TEMPERATURE: 97.8 F | SYSTOLIC BLOOD PRESSURE: 120 MMHG | RESPIRATION RATE: 18 BRPM | DIASTOLIC BLOOD PRESSURE: 76 MMHG | HEART RATE: 62 BPM

## 2023-06-17 DIAGNOSIS — H66.001 ACUTE SUPPURATIVE OTITIS MEDIA OF RIGHT EAR WITHOUT SPONTANEOUS RUPTURE OF TYMPANIC MEMBRANE, RECURRENCE NOT SPECIFIED: Primary | ICD-10-CM

## 2023-06-17 PROCEDURE — 99213 OFFICE O/P EST LOW 20 MIN: CPT | Performed by: NURSE PRACTITIONER

## 2023-06-17 NOTE — PROGRESS NOTES
Assessment & Plan     There are no diagnoses linked to this encounter.           Return in about 1 week (around 6/24/2023) for with regular provider if symptoms persist.    ANDREW Thomas CNP  [facility] TIFF Pearce is a 47 year old male who presents to clinic today for the following health issues:  Chief Complaint   Patient presents with     Ear Problem     Left ear pain and drainage for the last five days     HPI    URI Adult    Onset of symptoms was 5 day(s) ago.  Course of illness is worsening.    Severity moderate  Current and Associated symptoms: chills, stuffy nose, ear pain right and sore throat  Treatment measures tried include Tylenol/Ibuprofen, Fluids and Rest.  Predisposing factors include None.      Review of Systems  Constitutional, HEENT, cardiovascular, pulmonary, GI, , musculoskeletal, neuro, skin, endocrine and psych systems are negative, except as otherwise noted.      Objective    /76 (BP Location: Right arm, Patient Position: Sitting, Cuff Size: Adult Large)   Pulse 62   Temp 97.8  F (36.6  C) (Oral)   Resp 18   Wt (!) 179.6 kg (396 lb)   SpO2 96%   BMI 49.50 kg/m    Physical Exam   GENERAL: healthy, alert and no distress  EYES: Eyes grossly normal to inspection, PERRL and conjunctivae and sclerae normal  HENT: normal cephalic/atraumatic, right ear: purulent drainage in canal, left ear: normal: no effusions, no erythema, normal landmarks, nose and mouth without ulcers or lesions, oropharynx clear and oral mucous membranes moist  NECK: no adenopathy, no asymmetry, masses, or scars and thyroid normal to palpation  RESP: lungs clear to auscultation - no rales, rhonchi or wheezes  CV: regular rate and rhythm, normal S1 S2, no S3 or S4, no murmur, click or rub, no peripheral edema and peripheral pulses strong

## 2023-06-17 NOTE — PATIENT INSTRUCTIONS
augmentin twice a day for 7 days.    Push fluids  Lots of handwashing.   Ibuprofen as needed for fever or pain  Delsym or dayquil/nyquil for cough as needed     Rest as able.   Will call if any other labs positive.    F/u in the clinic if symptoms persist or worsen.

## 2023-07-18 ENCOUNTER — OFFICE VISIT (OUTPATIENT)
Dept: UROLOGY | Facility: CLINIC | Age: 48
End: 2023-07-18
Payer: COMMERCIAL

## 2023-07-18 VITALS — DIASTOLIC BLOOD PRESSURE: 70 MMHG | HEART RATE: 67 BPM | SYSTOLIC BLOOD PRESSURE: 121 MMHG

## 2023-07-18 DIAGNOSIS — Z30.2 ENCOUNTER FOR STERILIZATION: Primary | ICD-10-CM

## 2023-07-18 PROCEDURE — 55250 REMOVAL OF SPERM DUCT(S): CPT | Performed by: UROLOGY

## 2023-07-18 NOTE — PROGRESS NOTES
OFFICE VASECTOMY OPERATIVE NOTE  [facility]     DATE: 07/18/23  PATIENT: Ramses Spicer    YOB: 1975    Ramses Spicer is a 47 year old male.  He has 3 children and he wishes a vasectomy for birth control.  He has read the brochure and he has shaved himself.  I reviewed the vasectomy procedure with him explaining that it would be done with a local anesthetic given just in the location where the vasectomy would be done.  It would be done through incisions with the removal of segments of the vasa, cauterization of the ends, and burying the ends separate with sutures.      Pt. Understands:  -1/1000-1/3000 risk of future pregnancy even with perfectly done vasectomy  -vasectomy is a permanent procedure    -he may cryopreserve sperm if he wishes   -1-5% risk of post-vasectomy pain syndrome   -1-5% risk of complication, primarily infection or bleeding  - he needs to have a semen sample that shows no sperm before getting approval for unprotected intercourse.      Complications such as bleeding, infection, and damage to other tissues in the area were discussed. I recommended that an ice bag be placed on the scrotum off and on tonight to help reduce pain and swelling.      He was reminded that he was not sterile immediately after the vasectomy that it would take at least 20 ejaculations to empty the vas of any remaining sperm.  He was not to provide a semen sample until after the 20th ejaculation and not before 12 weeks after the vas. He was  to fulfill both of those requirements.   He understands it is his responsibility to find out the results of the vas before proceeding with intercourse without birth control protection.  Other items discussed were activity afterwards, returning to work, voluntary physical activity,  resuming sexual activity, clothing to wear, bathing, and care of the vas site and expected changes in the site as healing progresses.  After signing the permit, bilateral vasectomy was done as  described below.     ANESTHESIA: Local    DETAILS OF PROCEDURE: The risks of the procedure were explained in detail to the patient and informed consent was obtained. The patient was placed supine on the procedure table and the penis and scrotum were prepped and draped in the standard sterile fashion. The right vas deferens was isolated and brought up to the skin. 1% lidocaine local anesthesia was used to infiltrate the skin and the spermatic cord. A small incision was created and adventitial tissues were swept away from the vas. A 1 cm segment of the vas was excised and sent for pathology. The proximal and distal lumina of the vas were cauterized and then each segment was tied off in a knuckling-fashion with a 3-0 vicryl suture. Hemostasis was ensured and the segments were released back into the scrotum. Meticulous hemostasis was achieved. The skin was closed with 3-0 chromic suture in a horizontal mattress fashion. Next the left vas was brought to the skin and a vasectomy was performed in the similar fashion. The skin was closed with 3-0 chromic suture in a horizontal mattress fashion.    COMPLICATIONS: None    TAKE HOME MEDICATIONS: Tylenol every 6 hours, PRN    DISMISSAL INSTRUCTIONS:  - Ice pack to scrotum 15 to 20 minutes each hour awake for 36 to 40 hours.  - No strenuous activity or ejaculation for at least 7 days.  - No unprotected sexual activity until proven azoospermia on semen samples at 3 months.  - Referred to patient handout for normal postop expectations and indications to contact nurse or physician.    M.D.: Chaka Bray MD

## 2023-07-18 NOTE — PATIENT INSTRUCTIONS
Vasectomy Post-Op Care Instructions     You may go home after the procedure is completed. There may be some pain in your groin for 3 or 4 days after the operation. Some blood or yellow liquid may ooze from the cuts on the outside. The area around the cuts may swell and bruise. The sutures will dissolve on their own and do not require removal by the physician.    The first 48 hours after the procedure are crucial to healing. Generally, you may feel very good the day after the procedure, but that does not mean it is time to go back to normal activities. Resuming normal activities too soon is likely to cause internal bleeding and lots of pain.      Your provider may advise the following ways to care for yourself after the procedure:    Put an ice bag or package of frozen peas covered by a thin towel over the scrotum after the procedure. Leave the ice on the area for 30 minutes and then take it off for 30 minutes. Do this off and on for at least 24 hours.      Avoid all heavy lifting (greater than 10 pounds) for at least one week.    Wear a jockstrap or tight-fitting underwear for approximately one week to support the scrotum (testicles) and help reduce discomfort.    Take a pain reliever, such as Acetaminophen (Tylenol) or Ibuprofen (Advil), for any pain after the procedure. Your provider may prescribe a stronger pain medicine if it is needed.    You should be able to return to work in 48 hours, but strenuous activity should be avoided for two weeks.    Do not submerge the incision for 1 week following the procedure.    Ejaculation should be avoided for one week to allow the area to heal.    Follow-Up    You will need to have a negative post vasectomy analyses (no sperm seen) before you can discontinue birth control.    Semen Analysis   Schedule the post-vasectomy semen analysis three months after your vasectomy. You will need to ejaculate at least 20 times between your surgery and the first sample. Clinic staff will  contact your regarding your results via phone.    You will be given a container after the procedure. Collect the specimen at home by masturbation only (no lubrication, powders, saliva, or intercourse can be used) and bring it to the laboratory. You must have an appointment to drop off your specimen. The specimen needs to be delivered to the lab within 30-45 minutes.    Call [phone] with questions. For concerns or questions after hours or on weekends, please page the Urology Resident on-call: [phone].     .

## 2023-12-02 ENCOUNTER — MYC MEDICAL ADVICE (OUTPATIENT)
Dept: UROLOGY | Facility: CLINIC | Age: 48
End: 2023-12-02
Payer: COMMERCIAL

## 2023-12-08 ENCOUNTER — LAB (OUTPATIENT)
Dept: LAB | Facility: CLINIC | Age: 48
End: 2023-12-08
Payer: COMMERCIAL

## 2023-12-08 DIAGNOSIS — Z30.2 ENCOUNTER FOR STERILIZATION: ICD-10-CM

## 2023-12-08 LAB — SEMEN ANALYSIS P VAS PNL: NORMAL

## 2023-12-08 PROCEDURE — 89321 SEMEN ANAL SPERM DETECTION: CPT

## 2024-04-08 NOTE — ED PROVIDER NOTES
"  History     Chief Complaint:  Swallowed foreign body     HPI   Ramses Spicer is a 41 year old male with GERD, pyloric stenosis s/p pylorotomy, who presents with sensation of something stuck in his throat. He states he took a cough drop at night and believes it got stuck in his throat. He has been able to drink liquids, ate a granola bar without regurgitation. He has been trying to clear his throat but the sensation does not seem to \"move\". No cough or chocking sensation.    Allergies:  The patient has no known drug allergies.      Medications:    Prilosec  Claritin     Past Medical History:    Morbid obesity  GERD  Pyloric stenosis s/p pylorotomy    Past Surgical History:    Excision of foot exostosis, left  Achilles tendon repair, left  Pylorotomy for pyloric stenosis    Family History:    CVD  MI  Type 2 DM  Pancreatic cancer     Social History:  Marital Status:    Smoking status: former, quit 4/2016  Alcohol status: 1 drink / week   Patient presents with wife.  PCP: Gael Grant      Review of Systems   HENT: Negative for trouble swallowing.    Respiratory: Negative for cough and choking.    All other systems reviewed and are negative.      Physical Exam     Patient Vitals for the past 24 hours:   BP Temp Heart Rate SpO2   10/04/17 0440 150/73 97.7  F (36.5  C) 73 97 %           Physical Exam  Constitutional: Appears well-developed and well-nourished. Cooperative.    HENT:   Head: Atraumatic.   Mouth/Throat: Oropharynx is without erythema, edema or exudate and mucous membranes are moist.   Eyes: Conjunctivae normal and EOM are normal. Pupils are equal, round, and reactive to light.   Neck: Normal range of motion. Neck supple.   Cardiovascular: Normal rate, regular rhythm, normal heart sounds and radial and dorsalis pedis pulses are 2+ and symmetric.   Pulmonary/Chest: Effort normal, no stridor. Breath sounds normal.   Abdominal: Soft. Bowel sounds are normal. No splenomegaly or hepatomegaly. No " Mom called at 4:30 pm stating that Jose C has recently been having asthma flare.  Using daily inhaled steroid and is finding that she has been using albuterol, last given an hour ago which helped for maybe a short while but then was coughing so hard again that he threw up.  Calling to see if could repeat albuterol?    Advised ok to give another 2-4 puffs of albuterol but that this might be signifying a flare that is too significant to manage at home or with current regimen.  If seems to require albuterol more often than every 4ish hours, then might need ED tonight.  Otherwise, ok to repeat and use but observe closely and OV tomorrow.    At time of writing note, patient appears to have presented to ED for the persistent cough/wheeze.     tenderness. No rebound.  Musculoskeletal: Normal range of motion. No edema and no tenderness.  Neurological: Alert. Normal strength. No cranial nerve deficit.    Skin: Skin is warm and dry.   Psychiatric: Normal mood and affect.      Emergency Department Course     Emergency Department Course:  Nursing notes and vitals reviewed.  I performed an exam of the patient as documented above.     I discussed the findings and treatment plan with the patient. He expressed understanding of this plan and consented to discharge. He will be discharged home with instructions for care and follow up. In addition, the patient will return to the emergency department if their symptoms persist, worsen, if new symptoms arise or if there is any concern.  All questions were answered.       Impression & Plan      Medical Decision Making:  Patient presents with foreign body sensation in the back of his throat. He said he fell asleep with moisturizing lozenge in his throat and swallowed it.    Patient feels like there is still something stuck in his throat. He has not been gagging and is not SOB. He has been able to eat and drink, but the sensation persists.     I used the nasopharyngoscope to examine the patient's posterior pharynx. I did not appreciate any inflammatory changes of the posterior pharynx, the epiglottis looks normal, there is no foreign body seen above the cords. There are no symptoms to suggest he has aspirated or anything is below the cords. He is able to eat and drink so there is no impaction in the esophagus. He is advised to take his Zantac twice daily as he has history of reflux. He will eat a soft diet and will see his doctor for any ongoing symptoms and return to the ED for any worsening symptoms. I suspect the symptoms are due to esophageal irritation, perhaps from swallowing the cough drop, but I do not think further emergent testing is needed. Patient is discharged in good condition.    Diagnosis:    ICD-10-CM    1.  Swallowed foreign body, initial encounter T18.9XXA      Disposition:   Discharge     Scribe Disclosure:  I, Rashida Tovar, am serving as a scribe at 4:39 AM on 10/4/2017 to document services personally performed by No att. providers found, based on my observations and the provider's statements to me.     EMERGENCY DEPARTMENT       Ant Gallego MD  10/04/17 0658

## 2024-06-23 ENCOUNTER — HEALTH MAINTENANCE LETTER (OUTPATIENT)
Age: 49
End: 2024-06-23

## 2024-07-05 SDOH — HEALTH STABILITY: PHYSICAL HEALTH: ON AVERAGE, HOW MANY MINUTES DO YOU ENGAGE IN EXERCISE AT THIS LEVEL?: 30 MIN

## 2024-07-05 SDOH — HEALTH STABILITY: PHYSICAL HEALTH: ON AVERAGE, HOW MANY DAYS PER WEEK DO YOU ENGAGE IN MODERATE TO STRENUOUS EXERCISE (LIKE A BRISK WALK)?: 3 DAYS

## 2024-07-05 ASSESSMENT — SOCIAL DETERMINANTS OF HEALTH (SDOH): HOW OFTEN DO YOU GET TOGETHER WITH FRIENDS OR RELATIVES?: TWICE A WEEK

## 2024-07-10 ENCOUNTER — OFFICE VISIT (OUTPATIENT)
Dept: INTERNAL MEDICINE | Facility: CLINIC | Age: 49
End: 2024-07-10
Payer: COMMERCIAL

## 2024-07-10 VITALS
HEIGHT: 75 IN | WEIGHT: 315 LBS | DIASTOLIC BLOOD PRESSURE: 78 MMHG | TEMPERATURE: 97 F | SYSTOLIC BLOOD PRESSURE: 119 MMHG | HEART RATE: 55 BPM | OXYGEN SATURATION: 98 % | BODY MASS INDEX: 39.17 KG/M2

## 2024-07-10 DIAGNOSIS — E66.01 MORBID OBESITY WITH BMI OF 45.0-49.9, ADULT (H): ICD-10-CM

## 2024-07-10 DIAGNOSIS — G47.33 OSA (OBSTRUCTIVE SLEEP APNEA): ICD-10-CM

## 2024-07-10 DIAGNOSIS — Z13.6 CARDIOVASCULAR SCREENING; LDL GOAL LESS THAN 130: ICD-10-CM

## 2024-07-10 DIAGNOSIS — L72.3 SEBACEOUS CYST: ICD-10-CM

## 2024-07-10 DIAGNOSIS — Z00.01 ENCOUNTER FOR ROUTINE ADULT MEDICAL EXAM WITH ABNORMAL FINDINGS: Primary | ICD-10-CM

## 2024-07-10 DIAGNOSIS — Z12.5 SCREENING FOR PROSTATE CANCER: ICD-10-CM

## 2024-07-10 LAB
ERYTHROCYTE [DISTWIDTH] IN BLOOD BY AUTOMATED COUNT: 12.4 % (ref 10–15)
HCT VFR BLD AUTO: 46.8 % (ref 40–53)
HGB BLD-MCNC: 15.6 G/DL (ref 13.3–17.7)
MCH RBC QN AUTO: 30.2 PG (ref 26.5–33)
MCHC RBC AUTO-ENTMCNC: 33.3 G/DL (ref 31.5–36.5)
MCV RBC AUTO: 91 FL (ref 78–100)
PLATELET # BLD AUTO: 238 10E3/UL (ref 150–450)
RBC # BLD AUTO: 5.17 10E6/UL (ref 4.4–5.9)
WBC # BLD AUTO: 10.7 10E3/UL (ref 4–11)

## 2024-07-10 PROCEDURE — 80048 BASIC METABOLIC PNL TOTAL CA: CPT | Performed by: INTERNAL MEDICINE

## 2024-07-10 PROCEDURE — 99396 PREV VISIT EST AGE 40-64: CPT | Performed by: INTERNAL MEDICINE

## 2024-07-10 PROCEDURE — 85027 COMPLETE CBC AUTOMATED: CPT | Performed by: INTERNAL MEDICINE

## 2024-07-10 PROCEDURE — G0103 PSA SCREENING: HCPCS | Performed by: INTERNAL MEDICINE

## 2024-07-10 PROCEDURE — 36415 COLL VENOUS BLD VENIPUNCTURE: CPT | Performed by: INTERNAL MEDICINE

## 2024-07-10 NOTE — PROGRESS NOTES
Preventive Care Visit  [facility]  Santino Barakat MD, Internal Medicine  Jul 10, 2024      Assessment & Plan     (Z00.01) Encounter for routine adult medical exam with abnormal findings  (primary encounter diagnosis)  Comment: Discussed cardiac disease risk factor modification including screening, preventing, and treating hypertension, elevated lipids, diabetes, and smoking cessation.    Discussed age appropriate cancer screening recommendations as dictated by age group and past medical history.    Recommended making better food choices as often as possible, including lower carb, lower fat, lower salt diet and moderation in any alcohol intake.    Recommended maintaining regular physical activity/exercise throughout their lifetime.  Recommended safety and injury prevention (i.e. seatbelt use, safety equipment like helmets when biking, etc).    Reviewed preventive health counseling, as reflected in patient instructions       Regular exercise       Healthy diet/nutrition       Vision screening       Hearing screening       Immunizations  up to date  Plan to get the annual influenza vaccine each fall for sure by the end of October for the best protection throughout the winter flu season.   Get this from any pharmacy or flu shot clinic.   Plan to get an updated Covid booster each fall at least by the end of October for the best protection throughout the winter season.   Get this from any pharmacy or Covid vaccine clinic.              Colorectal cancer screening       Prostate cancer screening   Plan: REVIEW OF HEALTH MAINTENANCE PROTOCOL ORDERS            (E66.01,  Z68.42) Morbid obesity with BMI of 45.0-49.9, adult (H)  Comment: Obesity is contributing to obstructive sleep apnea .  Current BMI is: Body mass index is 46.87 kg/m ..  Reviewed weight patterns.   Obesity as a biological preventable and treatable disease, which is associated with significantly increased risk of many acute and  chronic health conditions.   Obesity has now been recognized as a chronic disease by the American Medical Association.  Obesity has serious co-morbidities associated with obesity including increased risk for hypertension, stroke, coronary artery disease, dyslipidemia, Type II diabetes, depression, sleep apnea, cancers of the colon, breast and endometrium, obstructive sleep apnea, osteoarthritis and female infertility.  Recommended regular aerobic exercise, recommended improved diet aiming at lowering amount of processed foods, lower sugars, moderation/reduction of simple carbs and fat in the diet, establishing more regular meal times, always eating breakfast, front loading some of the calories and adding more protein to the diet.     Referral to Weight Loss Clinic for discussion of more aggressive measures for weight loss can be considered (including medical options (e.g. GLP-1, or appetite suppressants, etc.) or bariatric surgical procedures.   Plan: REVIEW OF HEALTH MAINTENANCE PROTOCOL ORDERS,         Adult Comprehensive Weight Management         Referral, CBC with platelets, Basic metabolic         panel, PSA, screen            (G47.33) MEGGAN (obstructive sleep apnea)  Comment: This condition is currently controlled on the current medical regimen.  Continue current therapy.   Plan: REVIEW OF HEALTH MAINTENANCE PROTOCOL ORDERS            (Z12.5) Screening for prostate cancer  Comment: Discussed prostate cancer screening and PSA blood test.  Discussed that an elevated PSA blood test can be caused by things other than prostate cancer, including infection/inflammation, BPH, and recent sexual activity; and that elevated PSA blood test may require further investigation with a urologist   Plan: REVIEW OF HEALTH MAINTENANCE PROTOCOL ORDERS,         PSA, screen            (Z13.6) CARDIOVASCULAR SCREENING; LDL GOAL LESS THAN 130  Comment: Discussed cardiac disease risk factors and cardiac disease risk factor  "modification.   Plan: REVIEW OF HEALTH MAINTENANCE PROTOCOL ORDERS,         CBC with platelets, Basic metabolic panel            (L72.3) Sebaceous cyst  Comment:   Plan: Adult Dermatology  Referral               Patient has been advised of split billing requirements and indicates understanding: Yes        BMI  Estimated body mass index is 46.87 kg/m  as calculated from the following:    Height as of this encounter: 1.905 m (6' 3\").    Weight as of this encounter: 170.1 kg (375 lb).       Counseling  Appropriate preventive services were addressed with this patient via screening, questionnaire, or discussion as appropriate for fall prevention, nutrition, physical activity, Tobacco-use cessation, weight loss and cognition.  Checklist reviewing preventive services available has been given to the patient.          Carlos Pearce is a 48 year old, presenting for the following:  Physical     Pt takes omeprazole, Claritin and alive     Health Care Directive  Patient does not have a Health Care Directive or Living Will: Discussed advance care planning with patient; however, patient declined at this time.    HPI      1.)  The patient has had a history of obesity.  Reviewed the weigth curves.   Their current BMI is:  Body mass index is 46.87 kg/m .    Discussed current eating and exercise habits.     Reviewed weights in chart:  Wt Readings from Last 10 Encounters:   07/10/24 (!) 170.1 kg (375 lb)   06/17/23 (!) 179.6 kg (396 lb)   05/17/23 (!) 177.4 kg (391 lb 1.6 oz)   04/20/23 (!) 170.1 kg (375 lb)   04/15/22 (!) 171.9 kg (379 lb)   08/30/21 (!) 172.4 kg (380 lb)   11/24/20 (!) 176.4 kg (389 lb)   12/30/19 (!) 175.4 kg (386 lb 9.6 oz)   06/01/18 (!) 173.2 kg (381 lb 14.4 oz)   03/23/18 (!) 170.6 kg (376 lb 3.2 oz)              7/5/2024   General Health   How would you rate your overall physical health? (!) FAIR   Feel stress (tense, anxious, or unable to sleep) To some extent      (!) STRESS CONCERN      7/5/2024 "   Nutrition   Three or more servings of calcium each day? (!) NO   Diet: Regular (no restrictions)   How many servings of fruit and vegetables per day? (!) 2-3   How many sweetened beverages each day? 0-1            7/5/2024   Exercise   Days per week of moderate/strenous exercise 3 days   Average minutes spent exercising at this level 30 min            7/5/2024   Social Factors   Frequency of gathering with friends or relatives Twice a week   Worry food won't last until get money to buy more No   Food not last or not have enough money for food? No   Do you have housing? (Housing is defined as stable permanent housing and does not include staying ouside in a car, in a tent, in an abandoned building, in an overnight shelter, or couch-surfing.) No   Are you worried about losing your housing? No   Lack of transportation? No   Unable to get utilities (heat,electricity)? No   Want help with housing or utility concern? No      (!) HOUSING CONCERN PRESENT      7/5/2024   Dental   Dentist two times every year? Yes            7/5/2024   TB Screening   Were you born outside of the [place]? No            Today's PHQ-2 Score:       7/10/2024     2:36 PM   PHQ-2 ( 1999 [employer])   Q1: Little interest or pleasure in doing things 0   Q2: Feeling down, depressed or hopeless 0   PHQ-2 Score 0   Q1: Little interest or pleasure in doing things Not at all   Q2: Feeling down, depressed or hopeless Not at all   PHQ-2 Score 0           7/5/2024   Substance Use   Alcohol more than 3/day or more than 7/wk No   Do you use any other substances recreationally? No        Social History     Tobacco Use    Smoking status: Never    Smokeless tobacco: Former     Quit date: 4/1/2016   Vaping Use    Vaping status: Never Used   Substance Use Topics    Alcohol use: Yes     Comment: 0-1 drink/week    Drug use: No           7/5/2024   STI Screening   New sexual partner(s) since last STI/HIV test? No      ASCVD Risk   The ASCVD Risk score (Jl JUSTICE, et  "al., 2019) failed to calculate for the following reasons:    The valid total cholesterol range is 130 to 320 mg/dL       Reviewed and updated as needed this visit by Provider    Allergies  Meds     Fam Hx            **I reviewed the information recorded in the patient's EPIC chart (including but not limited to medical history, surgical history, family history, problem list, medication list, and allergy list) and updated the information as indicated based on the patients reported information.        Review of Systems  Constitutional, neuro, ENT, endocrine, pulmonary, cardiac, gastrointestinal, genitourinary, musculoskeletal, integument and psychiatric systems are negative, except as otherwise noted.     Objective    Exam  /78   Pulse 55   Temp 97  F (36.1  C) (Temporal)   Ht 1.905 m (6' 3\")   Wt (!) 170.1 kg (375 lb)   SpO2 98%   BMI 46.87 kg/m     Estimated body mass index is 46.87 kg/m  as calculated from the following:    Height as of this encounter: 1.905 m (6' 3\").    Weight as of this encounter: 170.1 kg (375 lb).    Physical Exam  GENERAL alert and no distress  EYES:  Normal sclera,conjunctiva, EOMI  HENT: oral and posterior pharynx without lesions or erythema, facies symmetric  NECK: Neck supple. No LAD, without thyroidmegaly.  RESP: Clear to ausculation bilaterally without wheezes or crackles. Normal BS in all fields.  CV: RRR normal S1S2 without murmurs, rubs or gallops.  LYMPH: no cervical lymph adenopathy appreciated  MS: extremities- no gross deformities of the visible extremities noted,   EXT:  no lower extremity edema  PSYCH: Alert and oriented times 3; speech- coherent  SKIN:  No obvious significant skin lesions on visible portions of face         Signed Electronically by: Santino Barakat MD    "

## 2024-07-10 NOTE — PATIENT INSTRUCTIONS
"     Plan to get the annual influenza vaccine each fall for sure by the end of October for the best protection throughout the winter flu season.   Get this from any pharmacy or flu shot clinic.       Plan to get an updated Covid booster each fall at least by the end of October for the best protection throughout the winter season.   Get this from any pharmacy or Covid vaccine clinic.         Return to see me in 1 year, schedule a follow up visit sooner if needed for anything else.  Use [employer] or Call [phone] to schedule the appointment with me.         5 GOALS TO PREVENT VASCULAR DISEASE:     1.  Aggressive blood pressure control, under 130/80 ideally.  Using medications if needed.    Your blood pressure is under good control    BP Readings from Last 4 Encounters:   07/10/24 119/78   07/18/23 121/70   06/17/23 120/76   06/16/23 107/62       2.  Aggressive LDL cholesterol (\"bad cholesterol\") lowering as indicated.    Your goal is an LDL under 130 for sure, preferably under 100.  (If you have diabetes or previous vascular disease, the the LDL goals would be under 100 for sure, preferably under 70.)    New guidelines identify four high-risk groups who could benefit from statins:   *people with pre-existing heart disease, such as those who have had a heart attack;   *people ages 40 to 75 who have diabetes of any type  *patients ages 40 to 75 with at least a 7.5% risk of developing cardiovascular disease over the next decade, according to a formula described in the guidelines  *patients with the sort of super-high cholesterol that sometimes runs in families, as evidenced by an LDL of 190 milligrams per deciliter or higher    Your cholesterol levels are well controlled.    Recent Labs   Lab Test 04/15/22  0925   CHOL 118   HDL 33*   LDL 73   TRIG 61       3.  Aggressive diabetic prevention, screening and/or management.      You do not have diabetes as of the most recent blood tests.     4.  No smoking    5.  Consider " daily preventative aspirin over age 50 if you have enough cardiac risk factors to place you at higher risk for the presence of vascular disease.    If you have any reason not to take aspirin such easy bruising or bleeding, stomach problems, other anticoagulant medications, or any other side effects, then you should not take Aspirin.     --Based on your current cardiac disease risk profile and/or age over 75, you do NOT need to take daily preventative aspirin.            Preventive Health Recommendations  Male Ages 45 - 64    Yearly exam:             See your health care provider every year in order to  o   Review health changes.   o   Discuss preventive care.    o   Review your medicines if your doctor has prescribed any.   Continue to review and reassess the management of any ongoing chronic medical conditions  Have a cholesterol test every 5 years, or more frequently if you are at risk for high cholesterol/heart disease.   Have a diabetes test (fasting glucose) at least every three years. If you are at risk for diabetes, you should have this test more often.   Regular colon cancer screening starting age 45 with either a colonoscopy, or stool test (either Cologuard every 3 years or yearly FIT stool test from ).  The intervals for colon cancer screening will be determined by family history and prior colon cancer screening results.   Routine prostate cancer screening with a PSA blood test every 1-2 years.   While the PSA blood test is not a direct cancer screening blood test, it detects inflammation within in the prostate, which could indicate possible cancer.  If the test is abnormal, you do not necessarily have prostate cancer, but would need further evaluation.    You should be tested each year for STDs (sexually transmitted diseases), if you re at risk.     Shots:   Get a flu shot each year.   Covid vaccines are now recommended annually.  Get the most updated Covid vaccine when it becomes available, consider  "getting this at the same time as the annual influenza vaccine.   Get a tetanus shot every 10 years.  Everyone over age 50 should strongly consider the Shingrix shingles vaccine to give you the best chance of avoiding future shingles infection (as many as 1 and 3 adults over age 50 may develop this condition in their lifetime).    Investigate the cost and coverage for Shingrix shingles vaccines with a pharmacist at a pharmacy.  They can tell you the coverage and cost and then give it to you if the price is acceptable.    In case your insurance does not cover a Shingrix shingles vaccine, it is cheaper to receive this shingles vaccine from a pharmacist in a pharmacy rather than in our clinic.    At this time, you only need the 2 Shingrix vaccines and then you are done.       Nutrition:  Eat at least 5 servings of fruits and vegetables daily.   Eat whole-grain bread, whole-wheat pasta and brown rice instead of white grains and rice.   Talk to your provider about Calcium and Vitamin D.        --Good Grains:  Oats, brown rice, Quinoa (these do not raise the blood sugar as much)     --Bad grains:  Anything made from wheat or white rice     (because these raise the blood sugars significantly, and the possible gluten issue from wheat for some people).      --Proteins:  Aim for \"lean proteins\" including chicken, fish, seafood, pork, turkey, and eggs (in moderation); Eat red meat only occasionally    Lifestyle  Exercise for at least 150 minutes a week (30 minutes a day, 5 days a week). This will help you control your weight and prevent disease.   Limit alcohol to one drink per day.   No smoking.   Wear sunscreen to prevent skin cancer.   See your dentist every six months for an exam and cleaning.   See your eye doctor every 1 to 2 years.        OBESITY/WEIGHT LOSS:     *  Obesity is a major problem in this [place].  Over 2/3 of adult Americans are overweight (BMI Over 25), and 1/3 are obese (BMI over 30)    *  Obesity is the " "leading cause of diabetes type II, which currently affects 1 out of 10 adult Americans and is projected to potentially affect 1 out of 3 adult Americans by 2040    *  Chronic obesity leads to \"insulin resistance\" which affects the carbohydrate (sugar) metabolism causing \"diabetisy\" which leads to type II Diabetes, increased visceral fat, a chronic low grade inflammatory state that leads to higher rates of vascular disease among other things.     *  Obesity is defined as BMI (body mass index) greater than 30.    *  Morbid obesity is defined as BMI over 40    Your most recent Body mass index is:  Body mass index is 46.87 kg/m .    The main principles in weight loss are diet and exercise. You need to have both.      + Be physically active. Do activities that you enjoy, give you energy and are safe for you to do.Gradually build up the intensity (how hard your body is working) of activity. Long-term, aim for 10,000 steps OR 30 minutes or more of activity most days of the week.     +  It is very hard to lose weight with diet changes alone.  You need to have regular exercise.  You should aim for either 3 hours total per week total of cumulative physical aerobic activity or approximately 10,000 steps per day of activity.  Buy a pedometer or other fitness tracker and keep track of your activity.  If your typical daily activity does not add up to 10,000 total steps, then make up the difference with walking or some sort of aerobic activity.        + Eat \"real food\" (not processed), I.e. Never eat a single food item with more than 6 ingredients listed on the label.    +  Eat mostly vegetables, fruit, whole grains and lean proteins. That way, you--not food manufacturers--control the ingredients that go into your meals.    +  Keep your food shopping to the outside of the grocery store, do not eat foods that come from a bag or box, do not eat foods with bar codes.    + Aim for 5 servings of fruits and vegetables a day. Choose a " "variety of vegetables with different colors. Have fresh fruit for dessert. Limit  deep-fried vegetables, such as french fries.    + Choose lean protein, such as chicken or fish. Try non-meat sources of protein such as beans, soy and other legumes.    + Choose whole grains. Whole grain foods, such as whole-wheat bread, brown rice, barley, quinoa and oatmeal, contain the entire grain kernel and are better for your health. Limit refined grains, such as white bread and rice.    + Satisfy hunger with unsaturated fat. Fat helps you feel satisfied. Choose unsaturated fats, such as canola or olive oils, nuts and seeds, oil-based dressings and avocados. Limit saturated fats, which are found in animal products and some plant oils, such as coconut and palm oils.    +  Figure out what kind of calories you are taking in now at this time.  It is hard to change behaviors that you do not understand.      +  Keep your calorie intake at least less than 1400 per day to start (under 1200 total if you want to be more aggressive), divided between 3 smaller meals (try to have no meal more than 500 calories) and 2 snacks.      +  \"Learn what 500 calories looks like\" and try to keep all meals under 500 calories.      + Pay attention to portion sizes. Use smaller plates, bowls and glasses. Portion out foods before you eat.    +  Use the \"fork rule\" for all eating. [If you can't pick food up with a fork, then the food will not turn off hunger very well. Using this rule helps patients avoid choosing the wrong types of food, especially if you have had a bariatric surgery operation.   The \"wrong foods\" include liquid calories such as soup, juice, soda, slimfast, ice cream, and beer, crumbly foods such as chips, crackers, and cookies, and starch based foods such as pasta, too much rice, and too much bread.]     + Drink water or unsweetened beverages. Avoid soda, sweetened coffees and teas, energy drinks and sports drinks, which are full of added " "sugar that your body does not need. Water is always the best option.    +  Drink one large glass of water BEFORE each meal.  This not only helps guard against dehydration, but it also helps provide additional \"fullness\" that will help reduce the amount of food that you will consume in a given meal by helping you fill up earlier.      + Eat mindfully. Take time to fully enjoy your food and pay attention to what you are eating. Make meals last 15 to 30 minutes. This gives your body a chance to become satisfied and tell your brain to stop eating. Pay attention to what you are eating, rather than doing other activities such as watching TV or driving. This helps you pay attention to your body s signals of hunger and fullness.    + Share meals when eating at restaurants, or put half of the entrée in a to-go container before you start eating.    +  Try to eat breakfast every day.  Skipping meals has been shown to be one of the factors that correlates the highest with obesity, (even more than fast food).  Try to avoid the typical carbohydrates and sugars that dominate the typical American breakfast.  Try to get more protein in earlier in the day, this will make you less hungry later.       +  Try High Protein Ensure or Boost nutritional supplements (or similar versions) for breakfast if nothing else.      +  I NEVER recommend over the counter diet aids such as Metabolife or Dexatrim since they have a high risk of side effects mainly fast heart rate, insomnia, and nervousness.      Good resources for nutrition are:         --Good Grains:  Oats, brown rice, Quinoa (these do not raise the blood sugar as much)     --Bad grains:  Anything made from wheat or white rice     (because these raise the blood sugars significantly, and the possible gluten issue from wheat for some people).      --Proteins:  Aim for \"lean proteins\" including chicken, fish, seafood, pork, turkey, and eggs (in moderation); Eat red meat only " "occasionally          ---> \"Wheat Belly\" by Moe Moreno  (a book about the many bad things processed wheat products (i.e. Bread) have caused in our country...which I believe has serious merit)       --->  \"The Paleo Diet\"  By Angelica Sheppard.             --->\"In Defense of Food\"  Of \"Food Rules\" (Jacques Sanders) a book that goes into the causes obesity epidemic in our country    Camron Sanders:                    ---> \"Picture Perfect Weight Loss\" by Jessee Ayoub (another good book about choices)        ---> \"Eat This, Not That\" Series,  by Chuy Eddy  (a book about food choices in the modern world)              ---> \"The Blood Sugar Solution\" by Khoi Mcdermott ( a book about obesity and insulin resistance leading to \"diabesity\")           Ryan Friedman ( a guidebook for counting calories, and knowing the components of the food that you eat)       \"The Best Life Diet\"  (a complete diet program)             Aim for a Healthy Weight Web Page at www.nhlbi.nih.gov       [place] Diet       Checo Solano:  \"Eat More, Weigh Less\" or \"The Program for the Reversal of Heart Disease\"       [facility] web site  the food and nutrition link.       American Heart Association       American Diabetes Association (www.diabetes.org)              Eat Better ? Move More ? Live Well     Eat 3 nutrient-rich meals each day    Don t skip meals--it will cause you to overeat later in the day!    Eating fiber (vegetables/fruits/whole grains) and protein with meals helps you stay full longer    Choose foods with less than 10 grams of sugar and 5 grams of fat per serving to prevent excess calories and weight re-gain  Eat around the same times each day to develop a routine eating schedule   Avoid snacking unless physically hungry.   Planned snacks: 1-2 times per day and no more than 150 calories    Eat protein first   Protein helps with healing, maintaining adequate muscle mass, reducing hunger and optimizing nutritional status   Aim for " 60-80 grams of protein per day   Fill up on Fiber   Fiber comes from plants--fruits, veggies, whole grains, nuts/seeds and beans   Fiber is low in calories, high in phytonutrients and helps you stay full longer   Aim for 25-35 grams per day by eating fiber with meals and snacks  Eat [id number]   Take 20-30 minutes to eat each meal by taking small bites, chewing foods to applesauce consistency or 20-30 times before you swallow   Eating foods too fast can delay satiety/fullness signals and increase overeating   Slow down your eating by using toddler utensils, putting your fork/spoon down between bites and not watching TV or emailing during meals!   Keep a Journal         Writing down what you eat, how you feel and when you are active helps you identify new changes to work on from week to week         Look for ways to cut 100 calories from your current diet 2-3 times per day  Drink 64 ounces of 0-Calorie drinks between meals   Water   Zero calorie Propel  or Vitamin Water     [employer]  Zero Calories   Crystal Light , Sugar-Free Sukhdeep-Aid , and other sugar-free lemonade or flavored rivas   Keep Caffeine to less than 300mg per day ie: 3-6oz cups coffee      Work up to 45-60 minutes of physical activity most days of the week   Helps with losing weight and prevent regaining those extra pounds!    Do a combo of cardio (walking/water exercises) and strength training (lifting weights/Vinyasa yoga)     Avoid Mindless Eating   Be present when you eat--take note of the smell, taste and quality of your food   Make a list of alternative activities you could do to prevent eating out of boredom/stress  Go for a walk, call a friend, chew gum, paint your nails, re-organize the garage, etc

## 2024-07-11 LAB
ANION GAP SERPL CALCULATED.3IONS-SCNC: 10 MMOL/L (ref 7–15)
BUN SERPL-MCNC: 20.3 MG/DL (ref 6–20)
CALCIUM SERPL-MCNC: 9.1 MG/DL (ref 8.6–10)
CHLORIDE SERPL-SCNC: 103 MMOL/L (ref 98–107)
CREAT SERPL-MCNC: 0.94 MG/DL (ref 0.67–1.17)
DEPRECATED HCO3 PLAS-SCNC: 24 MMOL/L (ref 22–29)
EGFRCR SERPLBLD CKD-EPI 2021: >90 ML/MIN/1.73M2
GLUCOSE SERPL-MCNC: 97 MG/DL (ref 70–99)
POTASSIUM SERPL-SCNC: 4.4 MMOL/L (ref 3.4–5.3)
PSA SERPL DL<=0.01 NG/ML-MCNC: 0.25 NG/ML (ref 0–2.5)
SODIUM SERPL-SCNC: 137 MMOL/L (ref 135–145)

## 2024-07-16 ENCOUNTER — TELEPHONE (OUTPATIENT)
Dept: SURGERY | Facility: CLINIC | Age: 49
End: 2024-07-16
Payer: COMMERCIAL

## 2024-09-10 NOTE — NURSING NOTE
"Chief Complaint   Patient presents with     Diarrhea       Initial /68  Pulse 69  Temp 98.4  F (36.9  C) (Oral)  Wt (!) 379 lb (171.9 kg)  SpO2 96%  BMI 48.01 kg/m2 Estimated body mass index is 48.01 kg/(m^2) as calculated from the following:    Height as of 2/20/17: 6' 2.5\" (1.892 m).    Weight as of this encounter: 379 lb (171.9 kg).  Medication Reconciliation: complete    " Quality 431: Preventive Care And Screening: Unhealthy Alcohol Use - Screening: Patient not identified as an unhealthy alcohol user when screened for unhealthy alcohol use using a systematic screening method Quality 130: Documentation Of Current Medications In The Medical Record: Current Medications Documented Quality 226: Preventive Care And Screening: Tobacco Use: Screening And Cessation Intervention: Patient screened for tobacco use and is an ex/non-smoker Detail Level: Detailed

## 2024-12-12 ENCOUNTER — OFFICE VISIT (OUTPATIENT)
Dept: SURGERY | Facility: CLINIC | Age: 49
End: 2024-12-12
Payer: COMMERCIAL

## 2024-12-12 ENCOUNTER — ALLIED HEALTH/NURSE VISIT (OUTPATIENT)
Dept: SURGERY | Facility: CLINIC | Age: 49
End: 2024-12-12
Payer: COMMERCIAL

## 2024-12-12 VITALS — BODY MASS INDEX: 39.17 KG/M2 | WEIGHT: 315 LBS | HEIGHT: 75 IN

## 2024-12-12 VITALS
WEIGHT: 315 LBS | OXYGEN SATURATION: 93 % | SYSTOLIC BLOOD PRESSURE: 127 MMHG | HEIGHT: 75 IN | BODY MASS INDEX: 39.17 KG/M2 | HEART RATE: 68 BPM | DIASTOLIC BLOOD PRESSURE: 75 MMHG

## 2024-12-12 DIAGNOSIS — E66.01 MORBID OBESITY WITH BMI OF 45.0-49.9, ADULT (H): Primary | ICD-10-CM

## 2024-12-12 DIAGNOSIS — G47.33 OSA (OBSTRUCTIVE SLEEP APNEA): ICD-10-CM

## 2024-12-12 DIAGNOSIS — K21.9 GASTROESOPHAGEAL REFLUX DISEASE WITHOUT ESOPHAGITIS: ICD-10-CM

## 2024-12-12 DIAGNOSIS — E66.813 CLASS 3 OBESITY: Primary | ICD-10-CM

## 2024-12-12 RX ORDER — LORATADINE 10 MG/1
10 TABLET ORAL DAILY
COMMUNITY

## 2024-12-12 NOTE — PROGRESS NOTES
"New Medical Weight Management Consult    PATIENT:  Ramses Spicer   MRN:         [id number]   :         1975  EVARISTO:         2024      Dear Rakel Kwan MD,    I had the pleasure of seeing your patient, Ramses Spicer. Full intake/assessment was done to determine barriers to weight loss success and develop a treatment plan. Ramses Spicer is a 48 year old male interested in treatment of medical problems associated with excess weight. He has a height of 6' 3\", a weight of 374 lbs 12.8 oz, and the calculated Body mass index is 46.85 kg/m .    Assessment & Plan   Problem List Items Addressed This Visit       Acid reflux disease     Controlled on OTC PPI         MEGGAN (obstructive sleep apnea)     uses oral appliance         Class 3 obesity - Primary     2024 Initial Eval.    Recommend both pharmacological and surgical intervention for obesity. Zepbound start.  Bariatric initial evaluation set up for  will review information session and review.  With wife before visit.               PROGRAM OVERVIEW  Reviewed options at [facility].   All questions were answered. Education provided on chronic disease management of obesity.    SURGICAL WEIGHT LOSS   Option presented given pt BMI and current comorbid conditions. Interested.   Website for bariatric online information session provided.  Pt will review at home and we will discuss at our follow up visit in .     MEDICATIONS:  We discussed healthy habits to assist with weight loss. We reviewed medications associated with weight gain. We discussed the role of pharmacological agents in the treatment of obesity and the \"off-label\" use of medications in this practice. We reviewed medication that may assist with weight loss. Indications, contraindications, risks/benefits, and potential side effects were discussed.   Zepbound was prescribed. Discussed that medications must always be used together with lifestyle changes. Reviewed " "rationale for long term use of pharmacotherapy in chronic disease management for obesity.      AOM Considerations: Candidate for all medications.   No clear indication one or another except some sweet cravings.  Will start with GLP-1.  Consider metformin or Topiramate 2nd line.              PATIENT INSTRUCTIONS:  Recommend we look at using all the tools available to treat obesity.    Pharmacotherapy:   Zepbound was approved.    Start Zepbound       2.   Surgical Intervention:   Bariatric Surgery  Please view bariatric online info session before our next appt: [email or web address]    3.  Education:   Below are other whiteboard videos to help explain obesity is a chronic disease as well as bariatric surgery.    Follow Up: Bariatric Consult: May 2025    60 minutes spent on the date of the encounter doing chart review, history and exam, review test results, counseling, developing plan of care, documentation, and further activities as noted above.      He has the following co-morbidities:        12/11/2024     9:01 AM   --   I have the following health issues associated with obesity Sleep Apnea    GERD (Reflux)   I have the following symptoms associated with obesity Knee Pain    Infertility (Difficulty Getting Pregnant)    Back Pain    Hip Pain           12/11/2024     9:01 AM   Referring Provider   Please name the provider who referred you to Medical Weight Management  If you do not know, please answer \"I Don't Know\" I don t know           12/11/2024     9:01 AM   Weight History   How concerned are you about your weight? Very Concerned   I became overweight As a Child   The following factors have contributed to my weight gain Eating Wrong Types of Food    Eating Too Much    Lack of Exercise    Genetic (Runs in the Family)   I have tried the following methods to lose weight Watching Portions or Calories    Exercise    Fasting   My lowest weight since age 19 was 295   My highest weight since age 19 was 396 "   The most weight I have ever lost was (lbs) 30   I have the following family history of obesity/being overweight My mother is overweight    My father is overweight    Many of my relatives are overweight   How has your weight changed over the last year? Lost- highest weight 390 lbs   How many pounds? 30     Been large since early childhood.  Was 6 Ft tall age 13.  Played College Football.  290 lbs.  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  Then became .  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      Biggest barrier is time.  Is HS  at [employer].       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  Wants to start make healthy foods.       12/11/2024     9:01 AM   Diet Recall Review with Patient   If you do eat breakfast, what types of food do you eat? Wake up 5 am  Cereal:  Cherrios    Banana- 10 am- Hunger   If you do eat lunch, what types of food do you typically eat? Lunch 11:30-12:30 Pm Ground tuekey patties, greek yogurt with granola, banana, two dove mjnis   If you do eat supper, what types of food do you typically eat? 5:30 pm Supper:  Spaghetti, tacos, rhings incan get my kids to eat.   If you do snack, what types of food do you typically eat? Minimal snacking. Bananas and granola bars   How many glasses of juice do you drink in a typical day? 0   How many of glasses of milk do you drink in a typical day? 1   If you do drink milk, what type? Skim   How many 8oz glasses of sugar containing drinks such as Sukhdeep-Aid/sweet tea do you drink in a day? 0   How many cans/bottles of sugar pop/soda/tea/sports drinks do you drink in a day? 0   How many cans/bottles of diet pop/soda/tea or sports drink do you drink in a day? 0   How often do you have a drink of alcohol? Monthly or Less   If you do drink, how many drinks might you have in a day? 1 or 2           12/11/2024      9:01 AM   Eating Habits   Generally, my meals include foods like these bread, pasta, rice, potatoes, corn, crackers, sweet dessert, pop, or juice A Few Times a Week   Generally, my meals include foods like these fried meats, brats, burgers, french fries, pizza, cheese, chips, or ice cream Less Than Weekly   Eat fast food (like McDonalds, Burger Elder, Taco Bell) Less Than Weekly   Eat at a buffet or sit-down restaurant Less Than Weekly   Eat most of my meals in front of the TV or computer Almost Everyday- Breakfast, lunch   Often skip meals, eat at random times, have no regular eating times Once a Week   Rarely sit down for a meal but snack or graze throughout Never   Eat extra snacks between meals A Few Times a Week   Eat most of my food at the end of the day Everyday   Eat in the middle of the night or wake up at night to eat Never   Eat extra snacks to prevent or correct low blood sugar Never   Eat to prevent acid reflux or stomach pain Never   Worry about not having enough food to eat Never   I eat when I am depressed Never   I eat when I am stressed Never   I eat when I am bored Less Than Weekly   I eat when I am anxious Never   I eat when I am happy or as a reward A Few Times a Week   I feel hungry all the time even if I just have eaten Less Than Weekly   Feeling full is important to me Never   I finish all the food on my plate even if I am already full Less Than Weekly   I can't resist eating delicious food or walk past the good food/smell Never   I eat/snack without noticing that I am eating Never   I eat when I am preparing the meal Never   I eat more than usual when I see others eating Never   I have trouble not eating sweets, ice cream, cookies, or chips if they are around the house Almost Everyday- sweet tooth.  Portion controlled   I think about food all day Never   What foods, if any, do you crave? Sweets/Candy/Chocolate           12/11/2024     9:01 AM   Amount of Food   I feel out of control when  eating Never   I eat a large amount of food, like a loaf of bread, a box of cookies, a pint/quart of ice cream, all at once Never   I eat a large amount of food even when I am not hungry Never   I eat rapidly Everyday   I eat alone because I feel embarrassed and do not want others to see how much I have eaten Never   I eat until I am uncomfortably full Never   I feel bad, disgusted, or guilty after I overeat Never           12/11/2024     9:01 AM   Activity/Exercise History   How much of a typical 12 hour day do you spend sitting? Most of the Day   How much of a typical 12 hour day do you spend lying down? Less Than Half the Day   How much of a typical day do you spend walking/standing? Less Than Half the Day   How many hours (not including work) do you spend on the TV/Video Games/Computer/Tablet/Phone? 2-3 Hours   How many times a week are you active for the purpose of exercise? Never   What keeps you from being more active? Lack of Time   How many total minutes do you spend doing some activity for the purpose of exercising when you exercise? More Than 30 Minutes       PAST MEDICAL HISTORY:  Past Medical History:   Diagnosis Date    Acid reflux disease     Morbidly obese (H)     MEGGAN (obstructive sleep apnea)     uses oral appliance           12/11/2024     9:01 AM   Work/Social History Reviewed With Patient   My employment status is Full-Time   My job is High School    How much of your job is spent on the computer or phone? 50%   How many hours do you spend commuting to work daily? Less than 1   What is your marital status? /In a Relationship   If in a relationship, is your significant other overweight? No   If you have children, are they overweight? No   Who do you live with? Wife and theee children   Who does the food shopping? I do       Social History     Tobacco Use    Smoking status: Former     Types: Other    Smokeless tobacco: Former     Quit date: 4/1/2016   Vaping Use    Vaping  status: Never Used   Substance Use Topics    Alcohol use: Yes     Comment: 0-1 drink/week    Drug use: No            12/11/2024     9:01 AM   Mental Health History Reviewed With Patient   Have you ever been physically or sexually abused? No   How often in the past 2 weeks have you felt little interest or pleasure in doing things? For Several Days   Over the past 2 weeks how often have you felt down, depressed, or hopeless? For Several Days           12/11/2024     9:01 AM   Questions Reviewed With Patient   How ready are you to make changes regarding your weight? Number 1 = Not ready at all to make changes up to 10 = very ready. 10   How confident are you that you can change? 1 = Not confident that you will be successful making changes up to 10 = very confident. 10           12/11/2024     9:01 AM   Sleep History Reviewed With Patient   How many hours do you sleep at night? 5   Bedtime: 11-12 PM     MEDICATIONS:   Current Outpatient Medications   Medication Sig Dispense Refill    loratadine (CLARITIN) 10 MG tablet Take 10 mg by mouth daily.      PRILOSEC OTC PO Take by mouth.      tirzepatide-Weight Management (ZEPBOUND) 2.5 MG/0.5ML prefilled pen Inject 0.5 mLs (2.5 mg) subcutaneously every 7 days. 2 mL 0       ALLERGIES:   Allergies   Allergen Reactions    Cats      Itchy eyes      Dogs      Itchy eyes     Seasonal Allergies      Itchy eyes  Sneezing         ROS:    HEENT  H/O glaucoma:  no  Cardiovascular  CAD:   no  Palpitations:   no  HTN:    no  Gastrointestinal  GERD:   Yes. Controlled on OTC  Constipation:   no  Liver Dz:   no  H/O Pancreatitis:  no  H/O Gallbladder Dz: no  Psychiatric  Moods Stable:  yes  Anxiety:   no  Depression:  no  Bipolar:  no  H/O ETOH/Drug abuse: no  H/O eating disorder: no  Endocrine  PMH/FMH of MTC or MEN2:  no  Neurologic:  H/O seizures:   no  Headaches:  no  Memory Impairment:  no    H/O kidney stones:  no  Kidney disease:  no      LABS/RECORDS REVIEWED:  Hemoglobin A1C   Date  Value Ref Range Status   11/06/2015 5.9 4.3 - 6.0 % Final     Vitamin D Deficiency screening   Date Value Ref Range Status   11/06/2015 28 20 - 75 ug/L Final     Comment:     Season, race, dietary intake, and treatment affect the concentration of   25-hydroxy-Vitamin D. Values may decrease during winter months and increase   during summer months. Values 20-29 ug/L may indicate Vitamin D insufficiency   and values <20 ug/L may indicate Vitamin D deficiency.   Vitamin D determination is routinely performed by an immunoassay specific for   25 hydroxyvitamin D3.  If an individual is on vitamin D2 (ergocalciferol)   supplementation, please specify 25 OH vitamin D2 and D3 level determination   by   LCMSMS test VITD23.       TSH   Date Value Ref Range Status   09/13/2011 1.51 0.4 - 5.0 mU/L Final     Sodium   Date Value Ref Range Status   07/10/2024 137 135 - 145 mmol/L Final   01/11/2016 140 133 - 144 mmol/L Final     Potassium   Date Value Ref Range Status   07/10/2024 4.4 3.4 - 5.3 mmol/L Final   01/11/2016 4.4 3.4 - 5.3 mmol/L Final     Chloride   Date Value Ref Range Status   07/10/2024 103 98 - 107 mmol/L Final   01/11/2016 107 94 - 109 mmol/L Final     Carbon Dioxide   Date Value Ref Range Status   01/11/2016 32 20 - 32 mmol/L Final     Carbon Dioxide (CO2)   Date Value Ref Range Status   07/10/2024 24 22 - 29 mmol/L Final     Anion Gap   Date Value Ref Range Status   07/10/2024 10 7 - 15 mmol/L Final   01/11/2016 1 (L) 3 - 14 mmol/L Final     Glucose   Date Value Ref Range Status   07/10/2024 97 70 - 99 mg/dL Final   04/15/2022 96 70 - 99 mg/dL Final   01/11/2016 88 70 - 99 mg/dL Final     Urea Nitrogen   Date Value Ref Range Status   07/10/2024 20.3 (H) 6.0 - 20.0 mg/dL Final   01/11/2016 17 7 - 30 mg/dL Final     Creatinine   Date Value Ref Range Status   07/10/2024 0.94 0.67 - 1.17 mg/dL Final   01/11/2016 1.05 0.66 - 1.25 mg/dL Final     GFR Estimate   Date Value Ref Range Status   07/10/2024 >90 >60  "mL/min/1.73m2 Final     Comment:     eGFR calculated using 2021 CKD-EPI equation.   01/11/2016 79 >60 mL/min/1.7m2 Final     Comment:     Non  GFR Calc     Calcium   Date Value Ref Range Status   07/10/2024 9.1 8.6 - 10.0 mg/dL Final   01/11/2016 8.2 (L) 8.5 - 10.1 mg/dL Final     ALT   Date Value Ref Range Status   01/11/2016 25 0 - 70 U/L Final     AST   Date Value Ref Range Status   01/11/2016 10 0 - 45 U/L Final     Cholesterol   Date Value Ref Range Status   04/15/2022 118 <200 mg/dL Final   01/11/2016 135 <200 mg/dL Final     HDL Cholesterol   Date Value Ref Range Status   01/11/2016 35 (L) >39 mg/dL Final     Direct Measure HDL   Date Value Ref Range Status   04/15/2022 33 (L) >=40 mg/dL Final     LDL Cholesterol Calculated   Date Value Ref Range Status   04/15/2022 73 <=100 mg/dL Final   01/11/2016 85 <100 mg/dL Final     Comment:     Desirable:       <100 mg/dl     Triglycerides   Date Value Ref Range Status   04/15/2022 61 <150 mg/dL Final   01/11/2016 75 <150 mg/dL Final     Comment:     Fasting specimen     Hemoglobin   Date Value Ref Range Status   07/10/2024 15.6 13.3 - 17.7 g/dL Final   09/13/2011 15.4 13.3 - 17.7 g/dL Final           BP Readings from Last 6 Encounters:   12/12/24 127/75   07/10/24 119/78   07/18/23 121/70   06/17/23 120/76   06/16/23 107/62   05/17/23 126/78       Pulse Readings from Last 6 Encounters:   12/12/24 68   07/10/24 55   07/18/23 67   06/17/23 62   06/16/23 60   05/17/23 70       PHYSICAL EXAM:  /75   Pulse 68   Ht 6' 3\" (1.905 m)   Wt (!) 374 lb 12.8 oz (170 kg)   SpO2 93%   BMI 46.85 kg/m    GENERAL: Healthy, alert and no distress  RESP: No audible wheeze, cough, or visible cyanosis.  No visible retractions or increased work of breathing.    SKIN: Visible skin clear. No significant rash, abnormal pigmentation or lesions.  PSYCH: Mentation appears normal, affect normal/bright, judgement and insight intact, normal speech and appearance " well-groomed.    COUNSELING:   Reviewed obesity as a chronic disease and comprehensive management stratagies.      We discussed Bariatric Basics including:  -eating 3 meals daily  -eating protein first  -eating slowly, chewing food well  -avoiding/limiting calorie containing beverages  -limiting carbohydrates and changing to whole grains  -limiting restaurant or cafeteria eating to twice a week or less    We discussed the importance of restorative sleep and stress management in maintaining a healthy weight.  We discussed insulin resistance and glycemic index as it relates to appetite and weight control.   We discussed the importance of physical activity including cardiovascular and strength training in maintaining a healthier weight and explored viable options.  Patient education of above written in AVS.      Sincerely,    Maryanne Ratliff PA-C

## 2024-12-12 NOTE — ASSESSMENT & PLAN NOTE
12/12/2024 Initial Eval.    Recommend both pharmacological and surgical intervention for obesity. Zepbound start.  Bariatric initial evaluation set up for June will review information session and review.  With wife before visit.

## 2024-12-12 NOTE — PROGRESS NOTES
MEDICAL WEIGHT LOSS INITIAL EVALUATION  Patient accompanied by: self  DIAGNOSIS:  Obesity Class III    NUTRITION HISTORY:  Diet and exercise history per pre-visit questionnaire as follows:    Based on your typical week, how often do you do the following?    Generally, my meals include foods like these: bread, pasta, rice, potatoes, corn, crackers, sweet dessert, pop, or juice. A Few Times a Week   Generally, my meals include foods like these: fried meats, brats, burgers, french fries, pizza, cheese, chips, or ice cream. Less Than Weekly   Eat fast food (like McDonalds, Burger Elder, Taco Bell). Less Than Weekly   Eat at a buffet or sit-down restaurant. Less Than Weekly   Eat most of my meals in front of the TV or computer. Almost Everyday   Often skip meals, eat at random times, have no regular eating times. Once a Week   Rarely sit down for a meal but snack or graze throughout. Never   Eat extra snacks between meals. A Few Times a Week   Eat most of my food at the end of the day. Everyday   Eat in the middle of the night or wake up at night to eat. Never   Eat extra snacks to prevent or correct low blood sugar. Never   Eat to prevent acid reflux or stomach pain. Never   Worry about not having enough food to eat. Never   Have you been to the food shelf at least a few times this year? No   Please answer the following questions based on your eating patterns during a typical week.    I eat when I am depressed. Never   I eat when I am stressed. Never   I eat when I am bored. Less Than Weekly   I eat when I am anxious. Never   I eat when I am happy or as a reward. A Few Times a Week   I feel hungry all the time even if I just have eaten. Less Than Weekly   Feeling full is important to me. Never   I finish all the food on my plate even if I am already full. Less Than Weekly   I can't resist eating delicious food or walk past the good food/smell. Never   I eat/snack without noticing that I am eating. Never   I eat when I am  preparing the meal. Never   I eat more than usual when I see others eating. Never   I have trouble not eating sweets, ice cream, cookies, or chips if they are around the house. Almost Everyday   I think about food all day. Never   What foods, if any, do you crave? Sweets/Candy/Chocolate   Please list any other foods you crave?    Please answer the following questions regarding the amount of food you have eaten over the past 6 months.    I feel out of control when eating. Never   I eat a large amount of food, like a loaf of bread, a box of cookies, a pint/quart of ice cream, all at once. Never   I eat a large amount of food even when I am not hungry. Never   I eat rapidly. Everyday   I eat alone because I feel embarrassed and do not want others to see how much I have eaten. Never   I eat until I am uncomfortably full. Never   I feel bad, disgusted, or guilty after I overeat. Never   I make myself vomit what I have eaten or use laxatives to get rid of food. Never   Please answer the following questions regarding if you usually eat a meal or not.  Please list all foods where appropriate. There are no wrong or right foods.      Do you typically eat breakfast? Yes   If you do eat breakfast, what types of food do you eat? Cereal   Do you typically eat lunch? Yes   If you do eat lunch, what types of food do you typically eat? Ground tuekey patties, greek yogurt with granola, banana, two dove mjnis   Do you typically eat supper? Yes   If you do eat supper, what types of food do you typically eat? Minna, christian, harvinder early get my kids to eat.   Do you typically eat snacks? Yes   If you do snack, what types of food do you typically eat? Bananas and granola bars   Do you like vegetables? Yes   Do you drink water? Yes   How many glasses of juice do you drink in a typical day? 0   How many of glasses of milk do you drink in a typical day? 1   If you do drink milk, what type? Skim   How many 8oz glasses of sugar containing  "drinks such as Sukhdeep-Aid/sweet tea do you drink in a day? 0   How many cans/bottles of sugar pop/soda/tea/sports drinks do you drink in a day? 0   How many cans/bottles of diet pop/soda/tea or sports drink do you drink in a day? 0   How often do you have a drink of alcohol? Monthly or Less   If you do drink, how many drinks might you have in a day? 1 or 2   Please answer these questions based on a typical 12 hour day including time at work and home.    How much of a typical 12 hour day do you spend sitting? Most of the Day   How much of a typical 12 hour day do you spend lying down? Less Than Half the Day   How much of a typical day do you spend walking/standing? Less Than Half the Day   How many hours (not including work) do you spend on the TV/Video Games/Computer/Tablet/Phone? 2-3 Hours   How many times a week are you active for the purpose of exercise? Never   What keeps you from being more active? Lack of Time   How many total minutes do you spend doing some activity for the purpose of exercising when you exercise? More Than 30 Minutes       ADDITIONAL INFORMATION: Pt interested in bariatric surgery and has tentatively set up evaluation for May. He may start AOM in the interim but wants to discuss it with wife first. NKFA but reports lactose sensitivity.       ANTHROPOMETRICS:  Height: 6' 3\"   Weight: 374 lbs 12.8 oz  BMI:  Body mass index is 46.85 kg/m .  NUTRITION DIAGNOSIS:   Obese class III related to overeating and poor lifestyle habits as evidence by patient's subjective statements and  BMI of 46.85 kg/m2   NUTRITION INTERVENTIONS  Nutrition Prescription:  Recommend modified energy- nutrient intake  Implementation:  Nutrition Education (Content):  Discussed portion sizes and plate method  Provided: Tips for Weight Loss and Weight Management, Plate Guidelines, Protein Sources for Weight Loss, Fiber Content in Food    Nutrition Education (Application):   Patient to practice goals as stated below  Patient " verbalizes understanding of diet by stating he will increase fruit/vegetable intake  Anticipate good compliance    Goals:  Have at least 3 food groups at each meal      FOLLOW UP AND MONITORING:    Other  - follow up in 4-8 weeks.     TIME SPENT WITH PATIENT:   24 minutes     Elizabeth Flowers RD, LD  Clinical Dietitian

## 2024-12-12 NOTE — PATIENT INSTRUCTIONS
"Hi Ramses!        It was great meeting with you today! Here are some links to the handouts I referenced:        Protein Sources:  [email or web address]     Fiber Sources:  [email or web address]     My Plate:  https://www.choosemyplate.gov/        A helpful search term to type into [employer], [employer], etc is \"myplate examples.\" [myplate examples - Google Search]     Key points from today:  Eat 3 meals/day at consistent intervals  Shoot for 110-130g protein (20-30g/meal)  Aim for 25-35g fiber (5-10g/meal)  Eat slowly: 20-30 minutes per meal     Here is a summary of the goals that we discussed:     Include at least 3 food groups at each meal  - Always protein, then select amidst fruits, vegetables and/or starches        Let's plan on following up in 1-2 months. This can be scheduled via our call center at . Of course, reach out sooner if you have any questions or concerns. Have a great week and welcome to the program!        Elizabeth Flowers, TEQUILA, LD?  Clinical Dietitian   "

## 2024-12-12 NOTE — PATIENT INSTRUCTIONS
Nice to talk with you today. Below is the plan discussed.-  POP Madden      Pt Instructions:   Recommend we look at using all the tools available to treat obesity.    Pharmacotherapy:   Zepbound was approved.      Start Zepbound  Inject 2.5 mg subcutaneously once weekly for 4 weeks   Then inject 5 mg injected subcutaneously once weekly      2.   Surgical Intervention:   Bariatric Surgery  Please view bariatric online info session before our next appt: [email or web address]    3.  Education:   Below are other whiteboard videos to help explain obesity is a chronic disease as well as bariatric surgery.    Obesity Medicine Videos (Copy link into browser to view)    Explaining Obesity: Meet Maxine  https://www.TrackBillube.com/watch?v=EAkiRp9CQFR    How Obesity Medicine Works  https://www.TrackBillube.com/watch?v=4XxGsZy8Z7J    Bariatric Surgery Education Videos (copy and paste into browser to view)    How bariatric surgery Works:    https://www.TrackBillube.com/watch?v=NP2djN2EVZb    Bariatric Surgery: Not an extreme solution  https://[employer]riv[employer].Zubka//media.[employer].org/video/whiteboards/A-Bariatric-Surgery-Story-Not-an-Extreme-Solution-to-Treat-Your-Obesity.mp4    Myths about Bariatric Surgery   https://www.TrackBillube.com/watch?v=CZEPX8uTc90

## 2024-12-16 ENCOUNTER — OFFICE VISIT (OUTPATIENT)
Dept: DERMATOLOGY | Facility: CLINIC | Age: 49
End: 2024-12-16
Attending: INTERNAL MEDICINE
Payer: COMMERCIAL

## 2024-12-16 DIAGNOSIS — L72.0 EIC (EPIDERMAL INCLUSION CYST): Primary | ICD-10-CM

## 2024-12-16 PROCEDURE — 99204 OFFICE O/P NEW MOD 45 MIN: CPT | Performed by: STUDENT IN AN ORGANIZED HEALTH CARE EDUCATION/TRAINING PROGRAM

## 2024-12-16 NOTE — PROGRESS NOTES
[facility] Dermatology Note  Encounter Date: Dec 16, 2024  Office Visit     Reviewed patients past medical history and pertinent chart review prior to patients visit today.     Dermatology Problem List:    Pertinent Medical History:  N/A    ____________________________________________    Assessment & Plan:     # Epidermal inclusion cyst, mid paraspinal back  - discussed risks and benefits of surgery   - risk factors include BMI  - pt wishes to proceed       Follow-up: next available     All risks, benefits and alternatives were discussed with patient.  Patient is in agreement and understands the assessment and plan.  All questions were answered.    STAFF  David Ignacio PA-C  [facility] Dermatology    _______________________________________    CC: Derm Problem (Cyst )    HPI:  Mr. Ramses Spicer is a(n) 48 year old male who presents as a as a new patient.    1) Cyst on back present x 10 years. Has not grown in size. Denies pain, irritation. Does not bother him much    Patient is otherwise feeling well, without additional skin concerns.      Physical Exam:  SKIN: Mott 1  Focused examination of mid back was performed.  - 3 cm flesh colored soft subcutaneous nodule with central punctum on mid spinal back     No other lesions of concern on areas examined.     Medications:  Current Outpatient Medications   Medication Sig Dispense Refill    loratadine (CLARITIN) 10 MG tablet Take 10 mg by mouth daily.      PRILOSEC OTC PO Take by mouth.      tirzepatide-Weight Management (ZEPBOUND) 2.5 MG/0.5ML prefilled pen Inject 0.5 mLs (2.5 mg) subcutaneously every 7 days. 2 mL 0     No current facility-administered medications for this visit.      Past Medical History:   Patient Active Problem List   Diagnosis    Acid reflux disease    Morbid obesity with BMI of 45.0-49.9, adult (H)    MEGGAN (obstructive sleep apnea)    Class 3 obesity     Past Medical History:   Diagnosis Date    Acid reflux disease      Morbidly obese (H)     MEGGAN (obstructive sleep apnea)     uses oral appliance       CC Santino Barakat MD  [address] [place] [address] on close of this encounter.

## 2024-12-16 NOTE — LETTER
12/16/2024      Ramses Spicer  [address] [facility] [address]      Dear Colleague,    Thank you for referring your patient, Ramses Spicer, to the [facility]. Please see a copy of my visit note below.    [facility] Dermatology Note  Encounter Date: Dec 16, 2024  Office Visit     Reviewed patients past medical history and pertinent chart review prior to patients visit today.     Dermatology Problem List:    Pertinent Medical History:  N/A    ____________________________________________    Assessment & Plan:     # Epidermal inclusion cyst, mid paraspinal back  - discussed risks and benefits of surgery   - risk factors include BMI  - pt wishes to proceed       Follow-up: next available     All risks, benefits and alternatives were discussed with patient.  Patient is in agreement and understands the assessment and plan.  All questions were answered.    STAFF  David Ignacio PA-C  [facility] Dermatology    _______________________________________    CC: Derm Problem (Cyst )    HPI:  Mr. Ramses Spicer is a(n) 48 year old male who presents as a as a new patient.    1) Cyst on back present x 10 years. Has not grown in size. Denies pain, irritation. Does not bother him much    Patient is otherwise feeling well, without additional skin concerns.      Physical Exam:  SKIN: Mott 1  Focused examination of mid back was performed.  - 3 cm flesh colored soft subcutaneous nodule with central punctum on mid spinal back     No other lesions of concern on areas examined.     Medications:  Current Outpatient Medications   Medication Sig Dispense Refill     loratadine (CLARITIN) 10 MG tablet Take 10 mg by mouth daily.       PRILOSEC OTC PO Take by mouth.       tirzepatide-Weight Management (ZEPBOUND) 2.5 MG/0.5ML prefilled pen Inject 0.5 mLs (2.5 mg) subcutaneously every 7 days. 2 mL 0     No current facility-administered medications for this visit.      Past  Medical History:   Patient Active Problem List   Diagnosis     Acid reflux disease     Morbid obesity with BMI of 45.0-49.9, adult (H)     MEGGAN (obstructive sleep apnea)     Class 3 obesity     Past Medical History:   Diagnosis Date     Acid reflux disease      Morbidly obese (H)      MEGGAN (obstructive sleep apnea)     uses oral appliance       CC Santino Barakat MD  [address] [place] [address] on close of this encounter.     Attestation with edits by Rich Palmer MD at 12/16/2024  2:39 PM:  I have personally examined this patient and agree with the PA's documentation and plan of care. I have reviewed and amended the PA's note. The documentation accurately reflects my clinical observations, diagnoses, treatment and follow-up plans.     Rich Palmer MD  Dermatology Staff      Again, thank you for allowing me to participate in the care of your patient.        Sincerely,        Rich Palmer MD

## 2025-01-06 ENCOUNTER — MYC REFILL (OUTPATIENT)
Dept: SURGERY | Facility: CLINIC | Age: 50
End: 2025-01-06
Payer: COMMERCIAL

## 2025-01-06 DIAGNOSIS — E66.01 MORBID OBESITY WITH BMI OF 45.0-49.9, ADULT (H): ICD-10-CM

## 2025-01-07 NOTE — TELEPHONE ENCOUNTER
Called phram - pt requested refill too early - can request 1/11/25.  Pt notified.  Not needed will refuse per clinic protocol.  Lucero Hand, MS, RD, RN

## 2025-01-17 NOTE — PATIENT INSTRUCTIONS
Hi Ramses!      It was great chatting with you again today! Here's a summary of the goals we discussed:    1. Reduce turkey patties at lunch to 1 aki, add a fruit or vegetables    2. Increase protein at breakfast   - Egg muffins, yogurt, cottage cheese, protein shake or bars, protein-enhanced cheerios          Plan on following up in 2-3 months. This can be scheduled via our call center at . Reach out sooner with any questions or concerns. Have a great day!      Elizabeth Flowers RD, LD  Clinical Dietitian

## 2025-01-17 NOTE — PROGRESS NOTES
MEDICAL WEIGHT LOSS FOLLOW UP    Reason for visit: medical weight loss     DIAGNOSIS:  Class III Obesity    ANTHROPOMETRICS:  Initial Weight: 374.8 pounds  Current Weight:  370 lbs 9.6 oz; with winter coat and shoes  BMI: Body mass index is 46.32 kg/m .     Weight Loss Medications:   Zepbound     NUTRITION HISTORY:  Breakfast: [wakes at 5am, eats at 6:30am] cereal (Cheerios; [employer] skim)   Lunch:  [11:30am] 2 ground turkey patties + yogurt + granola + 2 rosario kisses  Dinner: [5-6pm] baked chicken + carrots + brown rice + salad (baby spinach, shredded cheese, no dressing)   spaghetti + salad  scrambled eggs + sausage + pancakes or waffles  tacos (ground turkey)  Snacks:  [morning] banana  [afternoon] none  [evenings] 1 cookie   Beverage choices: water (48oz), coffee (20oz; creamer)   Eating behaviors: denies stress/boredom/emotional eating  Hunger: denies  Cravings: small amount of sweets after lunch/dinner  Fruits/vegetables: 3 servings/day  How often do you dine out: extremely rare; breakfast      Exercise:  None    Additional Information: Pt feeling well and tolerating diet + AOM. Successful in increasing protein + fruits/vegetables. Struggles with exercise r/t work/home/kids' schedule but will revisit this in warmer months. Discussed increasing protein at breakfast. Reviewed limitations in protein utilization at meals and recommended slight reduction in meat at lunch and add in more fruit/vegetables. Pt reports some mild constipation but no discomfort. Discussed fluid, fiber and movement; recommended contact clinic if symptoms worsen upon dose increase.      EVALUATION/PROGRESS TOWARDS GOALS:  Previous Goals:   Have at least 3 food groups at each meal - improving    Previous Nutrition Diagnosis:  Obese class III related to excess energy intake and self-monitoring deficit as evidenced by BMI of 46.85 kg/m2.  No change, modified below     Current Nutrition Diagnosis:   Obese class III related to excess  energy intake and self-monitoring deficit as evidenced by BMI of 46.32 kg/m2.    INTERVENTION:    Nutrition Prescription:  Recommend modified nutrient intake by decreasing energy intake.    Implementation:    Nutrition Education (Content):  Discussed previous goals and determined new goals  Encourage physical activity  Supported patient in attempted weight loss and behavior changes  Congratulated patient on successful weight loss   Patient verbalizes understanding of diet by stating he will increase protein at breakfast.   Anticipate good compliance    Goals:  Increase protein at breakfast (examples discussed)  Decrease to 1 turkey aki at lunch + add fruit and/or vegeables    Follow Up/Monitoring:  Other  -  patient to follow up in 8-12 weeks    Time Spent With Patient:  15 Minutes       Elizabeth Flowers RD, LD  Clinical Dietitian

## 2025-01-20 ENCOUNTER — OFFICE VISIT (OUTPATIENT)
Dept: SURGERY | Facility: CLINIC | Age: 50
End: 2025-01-20
Payer: COMMERCIAL

## 2025-01-20 VITALS — HEIGHT: 75 IN | WEIGHT: 315 LBS | BODY MASS INDEX: 39.17 KG/M2

## 2025-01-20 DIAGNOSIS — E66.01 MORBID OBESITY WITH BMI OF 45.0-49.9, ADULT (H): Primary | ICD-10-CM

## 2025-03-03 ENCOUNTER — OFFICE VISIT (OUTPATIENT)
Dept: DERMATOLOGY | Facility: CLINIC | Age: 50
End: 2025-03-03
Payer: COMMERCIAL

## 2025-03-03 DIAGNOSIS — D48.9 NEOPLASM OF UNCERTAIN BEHAVIOR: ICD-10-CM

## 2025-03-03 PROCEDURE — 13101 CMPLX RPR TRUNK 2.6-7.5 CM: CPT | Performed by: STUDENT IN AN ORGANIZED HEALTH CARE EDUCATION/TRAINING PROGRAM

## 2025-03-03 PROCEDURE — 11404 EXC TR-EXT B9+MARG 3.1-4 CM: CPT | Performed by: STUDENT IN AN ORGANIZED HEALTH CARE EDUCATION/TRAINING PROGRAM

## 2025-03-03 PROCEDURE — 13102 CMPLX RPR TRUNK ADDL 5CM/<: CPT | Performed by: STUDENT IN AN ORGANIZED HEALTH CARE EDUCATION/TRAINING PROGRAM

## 2025-03-03 PROCEDURE — 1126F AMNT PAIN NOTED NONE PRSNT: CPT | Performed by: STUDENT IN AN ORGANIZED HEALTH CARE EDUCATION/TRAINING PROGRAM

## 2025-03-03 ASSESSMENT — PAIN SCALES - GENERAL: PAINLEVEL_OUTOF10: NO PAIN (0)

## 2025-03-03 NOTE — PROGRESS NOTES
DERMATOLOGIC SURGERY REPORT    NAME OF PROCEDURE:  EXCISION AND CLOSURE    Surgeon:  Rich Palmer MD    PREOPERATIVE DIAGNOSIS: NUB  POSTOPERATIVE DIAGNOSIS: Same  Lesion Size: 34 mm lesion with 0mm margins  Final excision size: 34 mm  Repair type: Complex  FINAL REPAIR LENGTH:  87 mm  Location: Back  Prior Biopsy Accession #: [id number]    INDICATIONS:Excision was indicated for treatment. We discussed the principles of treatment and most likely complications including bleeding, infection, wound dehiscence, pain, nerve damage, and scarring. Informed consent was obtained and the patient underwent the procedure as follows.    PROCEDURE:  The patient was taken to the operative suite. The treatment area was anesthetized with 1% lidocaine with [id number] epinephrine buffered with bicarbonate. The area was washed with hibiclens, rinsed with saline and draped with sterile towels. The lesion was delineated and excised down to subcutaneous fat. Hemostasis was obtained by electrocoagulation.     REPAIR:  In order to repair this defect while maintaining the normal anatomic relations and function, we elected to utilize a linear closure. Closure was oriented so that the wound was in the patient's natural skin tension lines. Two redundant cones were removed by triangulation. Due to tightness of the surrounding skin deeper layers of the subcutaneous tissue were undermined extensively to a distance  greater than width of the defect on both sides by dissection in the subcutaneous plane until adequate tissue mobility was obtained. Deep layering suturing was performed using 3-0 monocryl deep, intradermal and subcutaneous sutures.  Final cutaneous approximation was achieved with 3-0 monocryl running superficial  sutures.      A total of 8 mL of anesthesia was administered for all surgical sites. Estimated blood loss was less than 5 mL for all surgical sites. A sterile pressure dressing was applied and wound care instructions, with a written  handout, were given. The patient was discharged alert and ambulatory.    Rich Palmer M.D.      Department of Dermatology

## 2025-03-03 NOTE — PATIENT INSTRUCTIONS
Excision Wound Care Instructions  I will experience scar, altered skin color, bleeding, swelling, pain, crusting and redness. I may experience altered sensation. Risks are excessive bleeding, infection, muscle weakness, thick (hypertrophic or keloidal) scar, and recurrence. A second procedure may be recommended to obtain the best cosmetic or functional result.  Possible complications of any surgical procedure are bleeding, infection, scarring, alteration in skin color and sensation, muscle weakness in the area, wound dehiscence or seperation, or recurrence of the lesion or disease. On occasion, after healing, a secondary procedure or revision may be recommended in order to obtain the best cosmetic or functional result.   After your surgery, a pressure bandage will be placed over the area that has sutures. This will help prevent bleeding. Please follow these instructions until you come back to clinic for suture removal on day 10, as they will help you to prevent complications as your wound heals.    For the First 24 hours After Surgery:  Leave the pressure bandage on and keep it dry. If it should come loose, you may retape it, but do not take it off.  Relax and take it easy. Do not do any vigorous exercise, heavy lifting, or bending forward. This could cause the wound to bleed.  Post-operative pain is usually mild. You may alternate between 1000 mg of Tylenol (acetaminophen) and 400 mg of Ibuprofen every 4 hours.  Do not take more than 4,000mg of acetaminophen in a 24 hour period or 3200 mg of Ibuprofen in a 24 hr period.  Avoid alcohol and vitamin E as these may increase your tendency to bleed.  You may put an ice pack around the bandaged area for 20 minutes every 2-3 hours. This may help reduce swelling, bruising, and pain. Make sure the ice pack is waterproof so that the pressure bandage does not get wet.   You may see a small amount of drainage or blood on your pressure bandage. This is normal. However, if  drainage or bleeding continues or saturates the bandage, you will need to apply firm pressure over the bandage with a washcloth for 15 minutes. If bleeding continues after applying pressure for 15 minutes then go to the nearest emergency room.  After the first 24 hours from Surgery  Carefully remove the bandage and start daily wound care and dressing changes. You may also now shower and get the wound wet.  Daily Wound Care:  Wash wound with a mild soap and water.  Use caution when washing the wound, be gentle and do not let the forceful shower stream hit the wound directly.  Pat dry.  Apply Vaseline (from a new container or tube) over the suture line with a Q-tip. It is very important to keep the wound continuously moist, as wounds heal best in a moist environment.  If you had stitches placed keep the site covered until sutures have either been removed or dissolve.  You can cover it with a Telfa (non-stick) dressing and tape or a band-aid.    If you are unable to keep wound covered, you must apply Vaseline every 2-3 hours (while awake) to ensure it is being kept moist for optimal healing. A dressing overnight is recommended to keep the area moist.    After the first 2 weeks from Surgery  At 2 weeks post surgery you can begin gently massaging the scar using light pressure applied in a circular motion. Do this for 5 minutes 3 times a day. Doing this consistently and regularly will improve the appearance of your final scar. You should continue to do this for AT LEAST 2 weeks, but ideally 4 in order to give your scar the best possible outcome    Call Us If:  You have pain that is not controlled with Tylenol/Ibuprofen  You have signs or symptoms of an infection, such as: fever over 100 degrees F, redness, warmth, or foul-smelling or yellow drainage from the wound.  Who should I call with questions?  [facility]: [phone]   [facility]:  [phone]  [facility]: [phone]  For urgent needs outside of business hours call the [facility] at [phone] and ask to speak with the dermatology resident on call

## 2025-03-03 NOTE — LETTER
3/3/2025      Ramses Spicer  [address]  [facility] [address]      Dear Colleague,    Thank you for referring your patient, Ramses Spicer, to the [facility]. Please see a copy of my visit note below.    DERMATOLOGIC SURGERY REPORT    NAME OF PROCEDURE:  EXCISION AND CLOSURE    Surgeon:  Rich Palmer MD    PREOPERATIVE DIAGNOSIS: NUB  POSTOPERATIVE DIAGNOSIS: Same  Lesion Size: 34 mm lesion with 0mm margins  Final excision size: 34 mm  Repair type: Complex  FINAL REPAIR LENGTH:  87 mm  Location: Back  Prior Biopsy Accession #: [id number]    INDICATIONS:Excision was indicated for treatment. We discussed the principles of treatment and most likely complications including bleeding, infection, wound dehiscence, pain, nerve damage, and scarring. Informed consent was obtained and the patient underwent the procedure as follows.    PROCEDURE:  The patient was taken to the operative suite. The treatment area was anesthetized with 1% lidocaine with [id number] epinephrine buffered with bicarbonate. The area was washed with hibiclens, rinsed with saline and draped with sterile towels. The lesion was delineated and excised down to subcutaneous fat. Hemostasis was obtained by electrocoagulation.     REPAIR:  In order to repair this defect while maintaining the normal anatomic relations and function, we elected to utilize a linear closure. Closure was oriented so that the wound was in the patient's natural skin tension lines. Two redundant cones were removed by triangulation. Due to tightness of the surrounding skin deeper layers of the subcutaneous tissue were undermined extensively to a distance  greater than width of the defect on both sides by dissection in the subcutaneous plane until adequate tissue mobility was obtained. Deep layering suturing was performed using 3-0 monocryl deep, intradermal and subcutaneous sutures.  Final cutaneous approximation was achieved with 3-0 monocryl running  superficial  sutures.      A total of 8 mL of anesthesia was administered for all surgical sites. Estimated blood loss was less than 5 mL for all surgical sites. A sterile pressure dressing was applied and wound care instructions, with a written handout, were given. The patient was discharged alert and ambulatory.    Rich Palmer M.D.      Department of Dermatology       Again, thank you for allowing me to participate in the care of your patient.        Sincerely,        Rich Palmer MD    Electronically signed

## 2025-03-06 LAB
PATH REPORT.COMMENTS IMP SPEC: NORMAL
PATH REPORT.COMMENTS IMP SPEC: NORMAL
PATH REPORT.FINAL DX SPEC: NORMAL
PATH REPORT.GROSS SPEC: NORMAL
PATH REPORT.MICROSCOPIC SPEC OTHER STN: NORMAL
PATH REPORT.RELEVANT HX SPEC: NORMAL

## 2025-03-12 ENCOUNTER — ALLIED HEALTH/NURSE VISIT (OUTPATIENT)
Dept: DERMATOLOGY | Facility: CLINIC | Age: 50
End: 2025-03-12
Payer: COMMERCIAL

## 2025-03-12 DIAGNOSIS — Z48.00 DRESSING CHANGE: Primary | ICD-10-CM

## 2025-03-12 NOTE — PROGRESS NOTES
Ramses Spicer comes into clinic today at the request of Dr. Palmer Ordering Provider for .  Suture Removal:  Incision was dry, clean and intact, incision cleansed with wound cleanser and sutures were removed. Pt tolerated the procedure. Benzoin and steristrips were not placed per protocol and pt was instructed to leave them in place for 7-10 days. It is okay to shower with these in place, no need to cover. After this time the strerstrips will start loosen and should be removed.         This service provided today was under the supervising provider of the day Dr. Palmer , who was available if needed.    Arnav Levine

## 2025-04-30 ENCOUNTER — MYC REFILL (OUTPATIENT)
Dept: SURGERY | Facility: CLINIC | Age: 50
End: 2025-04-30
Payer: COMMERCIAL

## 2025-04-30 DIAGNOSIS — E66.01 MORBID OBESITY WITH BMI OF 45.0-49.9, ADULT (H): ICD-10-CM

## 2025-06-09 ENCOUNTER — OFFICE VISIT (OUTPATIENT)
Dept: SURGERY | Facility: CLINIC | Age: 50
End: 2025-06-09
Payer: COMMERCIAL

## 2025-06-09 VITALS
WEIGHT: 315 LBS | OXYGEN SATURATION: 97 % | HEART RATE: 55 BPM | DIASTOLIC BLOOD PRESSURE: 77 MMHG | BODY MASS INDEX: 39.17 KG/M2 | SYSTOLIC BLOOD PRESSURE: 119 MMHG | HEIGHT: 75 IN

## 2025-06-09 DIAGNOSIS — G47.33 OSA (OBSTRUCTIVE SLEEP APNEA): ICD-10-CM

## 2025-06-09 DIAGNOSIS — E66.813 CLASS 3 OBESITY (H): Primary | ICD-10-CM

## 2025-06-09 PROCEDURE — 3074F SYST BP LT 130 MM HG: CPT | Performed by: PHYSICIAN ASSISTANT

## 2025-06-09 PROCEDURE — 99215 OFFICE O/P EST HI 40 MIN: CPT | Performed by: PHYSICIAN ASSISTANT

## 2025-06-09 PROCEDURE — 3078F DIAST BP <80 MM HG: CPT | Performed by: PHYSICIAN ASSISTANT

## 2025-06-09 PROCEDURE — G2211 COMPLEX E/M VISIT ADD ON: HCPCS | Performed by: PHYSICIAN ASSISTANT

## 2025-06-09 NOTE — PATIENT INSTRUCTIONS
Nice to talk with you today. Below is the plan discussed.-  POP Madden      Pt Instructions:  Continue Zepbound 5 mg weekly for your last 2 weeks then  Increase Zepbound to 7.5 mg weekly  If needed before next appt, can increase to 10 mg weekly    Follow up:    Schedule next Return wt management visit in Sept with Maryanne Ratliff PA-C and dietician.  Schedule Pre-surgery visit: Dec with Maryanne Ratliff PA-C and initial bariatric nutrition visit with dietician Dec.      Plan for barairic surgery June 2026.  Will start preparation Dec 2025.

## 2025-06-09 NOTE — ASSESSMENT & PLAN NOTE
Derrick doing well on Zepbound 5 mg.  Has seen weight loss stall will increase to 7.5 mg is interested in bariatric surgery with target date June 2026..  Will start process to surgery December 2025 presurgery visit and bariatric nutrition evaluation set up.  Reviewed initial evaluation and added bariatric surgery questions needed.  Will need to do initial bariatric physical exam in December.

## 2025-06-09 NOTE — PROGRESS NOTES
2025      Return Medical Weight Management Note     Ramses Spicer  MRN:  [id number]  :  1975    Assessment & Plan   Problem List Items Addressed This Visit       MEGGAN (obstructive sleep apnea)    uses oral appliance nightly. Anticipate improvement with further weight loss.             Relevant Medications    tirzepatide-Weight Management (ZEPBOUND) 7.5 MG/0.5ML prefilled pen    tirzepatide-Weight Management (ZEPBOUND) 10 MG/0.5ML prefilled pen    Class 3 obesity (H) - Primary    Derrick doing well on Zepbound 5 mg.  Has seen weight loss stall will increase to 7.5 mg is interested in bariatric surgery with target date 2026..  Will start process to surgery 2025 presurgery visit and bariatric nutrition evaluation set up.  Reviewed initial evaluation and added bariatric surgery questions needed.  Will need to do initial bariatric physical exam in December.              Relevant Medications    tirzepatide-Weight Management (ZEPBOUND) 7.5 MG/0.5ML prefilled pen    tirzepatide-Weight Management (ZEPBOUND) 10 MG/0.5ML prefilled pen        AOM Considerations: Candidate for all medications.   No clear indication one or another except some sweet cravings.  Will start with GLP-1.  Consider metformin or Topiramate 2nd line.     PATIENT INSTRUCTIONS:  Continue Zepbound 5 mg weekly for your last 2 weeks then  Increase Zepbound to 7.5 mg weekly  If needed before next appt, can increase to 10 mg weekly    Follow up:    Schedule next Return wt management visit in Sept with Maryanne Ratliff PA-C and dietician.  Schedule Pre-surgery visit: Dec with Maryanne Ratliff PA-C and initial bariatric nutrition visit with dietician Dec.      Plan for barairic surgery 2026.  Will start preparation Dec 2025.     42 minutes spent on the date of the encounter doing chart review, history and exam, result review, counseling, developing plan of care, documentation, and further activities as noted      INTERVAL  HISTORY:  Ramses returns for medical weight management follow up.  Last seen on 12/12/2024 for MWL initial eval. Started on Zepbound. Down 34 lbs  Is is going well. No SE.  He is at the 5 mg dose and has good satiety. Does not that his weight loss has slowed and he is noting more hunger and larger potions on the weekend 2 days prior to his next dose (Mondays).      Interested in bariatric surgery- recently started new job, has 3 small children, would like to shoot for surgery next June.  Today I reviewed his MWL initial evaluation and added portions relevant to bariatric surgery.        WEIGHT METRICS:  Body mass index is 42.51 kg/m .   Current Weight: (!) 340 lb 1.6 oz (154.3 kg)  Last Visits Weight: 374 lb 12.8 oz (170 kg)  Initial Weight (lbs): 374.8 lbs  Cumulative weight loss (lbs): 34.7  Weight Loss Percentage: 9.26%    Wt Readings from Last 10 Encounters:   06/09/25 (!) 340 lb 1.6 oz (154.3 kg)   01/20/25 (!) 370 lb 9.6 oz (168.1 kg)   12/12/24 (!) 374 lb 12.8 oz (170 kg)   12/12/24 (!) 374 lb 12.8 oz (170 kg)   07/10/24 (!) 375 lb (170.1 kg)   06/17/23 (!) 396 lb (179.6 kg)   05/17/23 (!) 391 lb 1.6 oz (177.4 kg)   04/20/23 (!) 375 lb (170.1 kg)   04/15/22 (!) 379 lb (171.9 kg)   08/30/21 (!) 380 lb (172.4 kg)                   12/11/2024     9:01 AM   Work/Social History Reviewed With Patient   My employment status is Full-Time   My job is High School    How much of your job is spent on the computer or phone? 50%   How many hours do you spend commuting to work daily? Less than 1   What is your marital status? /In a Relationship   If in a relationship, is your significant other overweight? No   If you have children, are they overweight? No   Who do you live with? Wife and theee children   Who does the food shopping? I do       Social History     Tobacco Use    Smoking status: Former     Types: Other    Smokeless tobacco: Former     Quit date: 4/1/2016   Vaping Use    Vaping status:  Never Used   Substance Use Topics    Alcohol use: Yes     Comment: 0-1 drink/week    Drug use: No      SOCIAL HISTORY:           Can you afford 3 meals a day?  Yes   Can you afford 40 dollars a month for vitamins? Yes     HABITS:        Have you or are you currently using street drugs or prescription strength medication for which you do not have a prescription for? No   Do you have a history of chemical dependency (alcohol or drug abuse)? No     PSYCHOLOGICAL HISTORY:       Have you ever attempted suicide? No   Have you had thoughts of suicide in the past year? No   Have you ever been hospitalized for mental illness or a suicide attempt? No   Are you currently seeing a therapist or counselor?  No   Are you currently seeing a psychiatrist? No     ROS:     HEENT  H/O glaucoma:            no  Cardiovascular  CAD:                            no  Palpitations:                no  HTN:                            no  Gastrointestinal  GERD:                         Yes. Controlled on OTC  Constipation:               no  Liver Dz:                      no  H/O Pancreatitis:         no  H/O Gallbladder Dz:    no  Resp:  MEGGAN:    Controlled with Mouth Guard  MARTINEZ   Yes  Dyspnea  No  Heme     H/O Blood clots  H/O anemia  H/O DVT/PE  H/O of Clotting disorder  Fam Hx of DVT< PE, clotting disorder  Musc  Joint pain:   No  Knee pain  Yes  Joint pain:  Yes  Psychiatric  Moods Stable:             yes  Anxiety:                       no  Depression:                 no  Bipolar:                        no  H/O ETOH/Drug abuse:          no  H/O eating disorder:    no  Endocrine  PMH/FMH of MTC or MEN2:  no  Neurologic:  H/O seizures:              no  Headaches:                 no  Memory Impairment:   no    H/O kidney stones:     no  Kidney disease:          no    LABS:  Hemoglobin A1C   Date Value Ref Range Status   11/06/2015 5.9 4.3 - 6.0 % Final     Creatinine   Date Value Ref Range Status   07/10/2024 0.94 0.67 - 1.17 mg/dL Final  "  01/11/2016 1.05 0.66 - 1.25 mg/dL Final     GFR Estimate   Date Value Ref Range Status   07/10/2024 >90 >60 mL/min/1.73m2 Final     Comment:     eGFR calculated using 2021 CKD-EPI equation.   01/11/2016 79 >60 mL/min/1.7m2 Final     Comment:     Non  GFR Calc     Carbon Dioxide   Date Value Ref Range Status   01/11/2016 32 20 - 32 mmol/L Final     Carbon Dioxide (CO2)   Date Value Ref Range Status   07/10/2024 24 22 - 29 mmol/L Final     Chloride   Date Value Ref Range Status   07/10/2024 103 98 - 107 mmol/L Final   01/11/2016 107 94 - 109 mmol/L Final     Urea Nitrogen   Date Value Ref Range Status   07/10/2024 20.3 (H) 6.0 - 20.0 mg/dL Final   01/11/2016 17 7 - 30 mg/dL Final     ALT   Date Value Ref Range Status   01/11/2016 25 0 - 70 U/L Final     AST   Date Value Ref Range Status   01/11/2016 10 0 - 45 U/L Final     Vitamin D Deficiency screening   Date Value Ref Range Status   11/06/2015 28 20 - 75 ug/L Final     Comment:     Season, race, dietary intake, and treatment affect the concentration of   25-hydroxy-Vitamin D. Values may decrease during winter months and increase   during summer months. Values 20-29 ug/L may indicate Vitamin D insufficiency   and values <20 ug/L may indicate Vitamin D deficiency.   Vitamin D determination is routinely performed by an immunoassay specific for   25 hydroxyvitamin D3.  If an individual is on vitamin D2 (ergocalciferol)   supplementation, please specify 25 OH vitamin D2 and D3 level determination   by   LCMSMS test [id number].       TSH   Date Value Ref Range Status   09/13/2011 1.51 0.4 - 5.0 mU/L Final        BP Readings from Last 6 Encounters:   06/09/25 119/77   12/12/24 127/75   07/10/24 119/78   07/18/23 121/70   06/17/23 120/76   06/16/23 107/62       Pulse Readings from Last 6 Encounters:   06/09/25 55   12/12/24 68   07/10/24 55   07/18/23 67   06/17/23 62   06/16/23 60       PE:  /77   Pulse 55   Ht 6' 3\" (1.905 m)   Wt (!) 340 lb 1.6 oz " (154.3 kg)   SpO2 97%   BMI 42.51 kg/m    GENERAL: Healthy, alert and no distress  RESP: No audible wheeze, cough, or visible cyanosis.  No visible retractions or increased work of breathing.    SKIN: Visible skin clear. No significant rash, abnormal pigmentation or lesions.  PSYCH: Mentation appears normal, affect normal/bright, judgement and insight intact

## 2025-06-10 ENCOUNTER — PATIENT OUTREACH (OUTPATIENT)
Dept: CARE COORDINATION | Facility: CLINIC | Age: 50
End: 2025-06-10
Payer: COMMERCIAL

## 2025-06-15 ENCOUNTER — TELEPHONE (OUTPATIENT)
Dept: SURGERY | Facility: CLINIC | Age: 50
End: 2025-06-15
Payer: COMMERCIAL

## 2025-06-16 NOTE — TELEPHONE ENCOUNTER
Retail Pharmacy Prior Authorization Team  Phone: [phone]    PRIOR AUTHORIZATION DENIED    Medication: ZEPBOUND 7.5 MG/0.5ML SC SOAJ  Insurance Company: [employer] - Phone [phone] Fax [phone]  Denial Date: 6/15/2025  Denial Reason(s):         Appeal Information:         Patient Notified: NO- Unfortunately, we cannot call the patient with denials because we do not know what next steps the MD will take nor can we give medical advice, please notify the patient of what they are to expect for the continuation of their therapy from the provider.

## 2025-06-17 NOTE — TELEPHONE ENCOUNTER
"     [facility] [place]  [address]  [address]  [place] [address]  Phone: [phone]  Fax: [phone]       2025    To:   [facility]    RE: Ramses Spicer  [address]  [place] [address]  : 1975  Policy #: [id number]    To Whom It May Concern,    I am writing on behalf of my patient, Ramses Spicer, to document the medical necessity of Zepbound for the treatment of Obstructive Sleep Apnea (MEGGAN) in setting of obesity. This letter provides information about the patient s medical history and diagnosis and a statement summarizing my treatment rationale.    Summary of Patient History and Diagnosis    Ramses Spicer is a 49-year-old male with a diagnosis of: Class III Obesity (BMI at least 40 kg/m ) and Obstructive Sleep Apnea. Obstructive Sleep Apnea diagnosis was confirmed by sleep study on 10/4/2011. Despite prior attempts at management, including lifestyle modifications and use of Positive Airway Pressure (PAP) breathing therapy, the patients MEGGAN remains inadequately controlled, significantly impacting their quality of life and overall health.    Initial Visit Weight and BMI   Estimated body mass index is 46.85 kg/m  as calculated from the following:    Height as of an earlier encounter on 2024: 6' 3\".    Weight as of an earlier encounter on 2024: 374 lb.    Current Weight and BMI - Taking Zepbound (tirzepatide)  Estimated body mass index is 42.26 kg/m  as calculated from the following:    Height as of an earlier encounter on 2025: 6' 3\".    Weight as of an earlier encounter on 2025: 338 lb.    Treatment Rationale  The recent FDA approval of Zepbound for Obstructive Sleep Apnea provides a new, evidence-based therapeutic option for addressing this condition. Clinical trials, including the SURMOUNT-MEGGAN study, have demonstrated Zepbound s efficacy in reducing airway obstruction by promoting significant weight loss and " improving upper airway function--key factors in the pathophysiology of MEGGAN.    The SURMOUNT-MEGGAN study specifically evaluated the impact of Zepbound on individuals with MEGGAN and excess weight. The study found that patients receiving Zepbound experienced a significant reduction in the Apnea-Hypopnea Index (AHI), indicating an improvement in MEGGAN severity. Additionally, participants reported enhanced sleep quality, reduced daytime sleepiness, and improvements in associated weight-related comorbidities such as hypertension and insulin resistance. These findings highlight Zepbound as a transformative option in MEGGAN management, especially for patients where weight loss plays a critical role.    Zepbound s mechanism as a dual GIP/GLP-1 receptor agonist addresses the root causes of MEGGAN in individuals with excess weight, offering a comprehensive approach to management. Its ability to improve both respiratory and metabolic outcomes makes it a pivotal therapy for my patient.    You are choosing to deny continued coverage for Zepbound due to not receiving evidence that that the patient has a 50% improvement in AHI score from baseline. Expecting this is not realistic to ask often insurance will limit the amount of sleep studies patients can have in a set amount of years. Furthermore, if this is required, it takes multiple months to get back in to Sleep Medicine so it should not be a requirement for continued coverage. This requirement is not medically substantiated and to get follow-up to assess this requirement would take months and thus should not obstruct continued coverage.     This denial not only limits the patient s ability to access a critical treatment but also contradicts evidence-based medical practices. Providing coverage for Zepbound will empower my patient to achieve better health outcomes and reduce the long-term healthcare burden associated with untreated or poorly managed MEGGAN.    Duration  12 months    Summary  In  summary, Zepbound is medically necessary for this patient s moderate to severe Obstructive Sleep Apnea with obesity. Please call my office at [phone] if I can provide you with any additional information to approve my request. I look forward to receiving your timely response and approval of this request.    Sincerely,    Maryanne CAGLE [facility]    STEVAN/daron

## 2025-06-17 NOTE — TELEPHONE ENCOUNTER
Retail Pharmacy Prior Authorization Team  Phone: [phone]    Medication Appeal Initiation    Medication: ZEPBOUND 7.5 MG/0.5ML SC SOAJ  Appeal Start Date:  6/17/2025  Insurance Company: [employer]  Insurance Phone: [phone]  Insurance Fax: [phone]  Comments:   FAXED LMN TO INSURANCE

## 2025-07-29 ENCOUNTER — MYC REFILL (OUTPATIENT)
Dept: SURGERY | Facility: CLINIC | Age: 50
End: 2025-07-29
Payer: COMMERCIAL

## 2025-07-29 ENCOUNTER — TELEPHONE (OUTPATIENT)
Dept: SURGERY | Facility: CLINIC | Age: 50
End: 2025-07-29
Payer: COMMERCIAL

## 2025-07-29 VITALS — BODY MASS INDEX: 41.75 KG/M2 | WEIGHT: 315 LBS

## 2025-07-29 DIAGNOSIS — G47.33 OSA (OBSTRUCTIVE SLEEP APNEA): ICD-10-CM

## 2025-07-29 DIAGNOSIS — E66.813 CLASS 3 OBESITY (H): ICD-10-CM

## 2025-07-29 NOTE — TELEPHONE ENCOUNTER
"Prior Authorization Retail Medication Request    Medication/Dose: Zepbound 7.5 mg weekly    ICD code (if different than what is on RX):    MEGGAN (obstructive sleep apnea) [G47.33]     Class 3 obesity (H) [E66.813]  -        Previously Tried and Failed:  diet and lifestyle    Rationale:    Ramses Spicer is a 49-year-old male with a diagnosis of: Class III Obesity (BMI at least 40 kg/m ) and Obstructive Sleep Apnea. Obstructive Sleep Apnea diagnosis was confirmed by sleep study on 10/4/2011. Despite prior attempts at management, including lifestyle modifications and use of Positive Airway Pressure (PAP) breathing therapy, the patients MEGGAN remains inadequately controlled, significantly impacting their quality of life and overall health.     Hx of obesity   Ht: 6'3\"  Body mass index is 41.75 kg/m . 7/29/25  Current Weight:  334 lb (151.5 kg) 7/29/25      Initial Weight (lbs): 374.8 lbs Date: 12/12/24  Initial BMI: 46.85 Date: 12/12/24  Cumulative weight loss (lbs): 40.8  Weight Loss Percentage: 10.9%    Insurance Name:      [facility]     Insurance ID:  [id number]       Pharmacy Information (if different than what is on RX)  Name:  [id number] - [place] [address] AT [facility] [address]   Phone:  [phone]     "

## 2025-07-30 NOTE — TELEPHONE ENCOUNTER
PA Initiation    Medication: ZEPBOUND 7.5 MG/0.5ML SC SOAJ  Insurance Company: [employer] - Phone [phone] Fax [phone]  Pharmacy Filling the Rx: [id number] - [place], [place] - [facility] AT [facility] [address]  Filling Pharmacy Phone: [phone]  Filling Pharmacy Fax: [phone]  Start Date: 7/30/2025

## 2025-08-07 PROBLEM — E66.813 CLASS 3 OBESITY (H): Status: RESOLVED | Noted: 2024-12-12 | Resolved: 2025-08-07

## (undated) DEVICE — TUBING SUCTION MEDI-VAC 1/4"X20' N620A - HE

## (undated) DEVICE — SOL WATER IRRIG 1000ML BOTTLE 2F7114

## (undated) DEVICE — SUCTION MANIFOLD NEPTUNE 2 SYS 1 PORT 702-025-000

## (undated) RX ORDER — ONDANSETRON 2 MG/ML
INJECTION INTRAMUSCULAR; INTRAVENOUS
Status: DISPENSED
Start: 2023-06-16

## (undated) RX ORDER — PROPOFOL 10 MG/ML
INJECTION, EMULSION INTRAVENOUS
Status: DISPENSED
Start: 2023-06-16

## (undated) RX ORDER — LIDOCAINE HYDROCHLORIDE 10 MG/ML
INJECTION, SOLUTION EPIDURAL; INFILTRATION; INTRACAUDAL; PERINEURAL
Status: DISPENSED
Start: 2023-06-16